# Patient Record
Sex: MALE | Race: WHITE | NOT HISPANIC OR LATINO | URBAN - METROPOLITAN AREA
[De-identification: names, ages, dates, MRNs, and addresses within clinical notes are randomized per-mention and may not be internally consistent; named-entity substitution may affect disease eponyms.]

---

## 2017-01-09 ENCOUNTER — INPATIENT (INPATIENT)
Facility: HOSPITAL | Age: 77
LOS: 12 days | Discharge: HOME HEALTH | DRG: 220 | End: 2017-01-22
Attending: THORACIC SURGERY (CARDIOTHORACIC VASCULAR SURGERY) | Admitting: THORACIC SURGERY (CARDIOTHORACIC VASCULAR SURGERY)
Payer: MEDICARE

## 2017-01-09 ENCOUNTER — RESULT REVIEW (OUTPATIENT)
Age: 77
End: 2017-01-09

## 2017-01-09 VITALS
OXYGEN SATURATION: 96 % | HEART RATE: 81 BPM | TEMPERATURE: 98 F | DIASTOLIC BLOOD PRESSURE: 648 MMHG | RESPIRATION RATE: 18 BRPM | SYSTOLIC BLOOD PRESSURE: 123 MMHG

## 2017-01-09 DIAGNOSIS — Z90.79 ACQUIRED ABSENCE OF OTHER GENITAL ORGAN(S): Chronic | ICD-10-CM

## 2017-01-09 DIAGNOSIS — Z95.1 PRESENCE OF AORTOCORONARY BYPASS GRAFT: Chronic | ICD-10-CM

## 2017-01-09 DIAGNOSIS — Z90.89 ACQUIRED ABSENCE OF OTHER ORGANS: Chronic | ICD-10-CM

## 2017-01-09 DIAGNOSIS — Z98.890 OTHER SPECIFIED POSTPROCEDURAL STATES: Chronic | ICD-10-CM

## 2017-01-09 LAB
ALBUMIN SERPL ELPH-MCNC: 3.5 G/DL — SIGNIFICANT CHANGE UP (ref 3.4–5)
ALP SERPL-CCNC: 82 U/L — SIGNIFICANT CHANGE UP (ref 40–120)
ALT FLD-CCNC: 20 U/L — SIGNIFICANT CHANGE UP (ref 12–42)
ANION GAP SERPL CALC-SCNC: 12 MMOL/L — SIGNIFICANT CHANGE UP (ref 9–16)
APPEARANCE UR: CLEAR — SIGNIFICANT CHANGE UP
APTT BLD: 37.2 SEC — SIGNIFICANT CHANGE UP (ref 27.5–37.4)
AST SERPL-CCNC: 13 U/L — LOW (ref 15–37)
BASOPHILS NFR BLD AUTO: 0.2 % — SIGNIFICANT CHANGE UP (ref 0–2)
BILIRUB SERPL-MCNC: 0.6 MG/DL — SIGNIFICANT CHANGE UP (ref 0.2–1.2)
BILIRUB UR-MCNC: NEGATIVE — SIGNIFICANT CHANGE UP
BLD GP AB SCN SERPL QL: NEGATIVE — SIGNIFICANT CHANGE UP
BLD GP AB SCN SERPL QL: NEGATIVE — SIGNIFICANT CHANGE UP
BUN SERPL-MCNC: 20 MG/DL — SIGNIFICANT CHANGE UP (ref 7–23)
CALCIUM SERPL-MCNC: 8.2 MG/DL — LOW (ref 8.5–10.5)
CHLORIDE SERPL-SCNC: 107 MMOL/L — SIGNIFICANT CHANGE UP (ref 96–108)
CO2 SERPL-SCNC: 24 MMOL/L — SIGNIFICANT CHANGE UP (ref 22–31)
COLOR SPEC: YELLOW — SIGNIFICANT CHANGE UP
CREAT SERPL-MCNC: 0.92 MG/DL — SIGNIFICANT CHANGE UP (ref 0.5–1.3)
DIFF PNL FLD: NEGATIVE — SIGNIFICANT CHANGE UP
EOSINOPHIL NFR BLD AUTO: 0 % — SIGNIFICANT CHANGE UP (ref 0–6)
GLUCOSE SERPL-MCNC: 98 MG/DL — SIGNIFICANT CHANGE UP (ref 70–99)
GLUCOSE UR QL: NEGATIVE — SIGNIFICANT CHANGE UP
HBA1C BLD-MCNC: 6 % — HIGH (ref 4.8–5.6)
HCT VFR BLD CALC: 40.3 % — SIGNIFICANT CHANGE UP (ref 39–50)
HGB BLD-MCNC: 13.4 G/DL — SIGNIFICANT CHANGE UP (ref 13–17)
INR BLD: 1.2 — HIGH (ref 0.88–1.16)
KETONES UR-MCNC: NEGATIVE — SIGNIFICANT CHANGE UP
LEUKOCYTE ESTERASE UR-ACNC: (no result)
LYMPHOCYTES # BLD AUTO: 23.7 % — SIGNIFICANT CHANGE UP (ref 13–44)
MCHC RBC-ENTMCNC: 26.2 PG — LOW (ref 27–34)
MCHC RBC-ENTMCNC: 33.3 G/DL — SIGNIFICANT CHANGE UP (ref 32–36)
MCV RBC AUTO: 78.9 FL — LOW (ref 80–100)
MONOCYTES NFR BLD AUTO: 10 % — SIGNIFICANT CHANGE UP (ref 2–14)
NEUTROPHILS NFR BLD AUTO: 66.1 % — SIGNIFICANT CHANGE UP (ref 43–77)
NITRITE UR-MCNC: NEGATIVE — SIGNIFICANT CHANGE UP
NT-PROBNP SERPL-SCNC: 534 PG/ML — HIGH
PH UR: 5.5 — SIGNIFICANT CHANGE UP (ref 4–8)
PLATELET # BLD AUTO: 147 K/UL — LOW (ref 150–400)
POTASSIUM SERPL-MCNC: 3.6 MMOL/L — SIGNIFICANT CHANGE UP (ref 3.5–5.3)
POTASSIUM SERPL-SCNC: 3.6 MMOL/L — SIGNIFICANT CHANGE UP (ref 3.5–5.3)
PROT SERPL-MCNC: 6.5 G/DL — SIGNIFICANT CHANGE UP (ref 6.4–8.2)
PROT UR-MCNC: NEGATIVE MG/DL — SIGNIFICANT CHANGE UP
PROTHROM AB SERPL-ACNC: 13.3 SEC — HIGH (ref 10–13.1)
RBC # BLD: 5.11 M/UL — SIGNIFICANT CHANGE UP (ref 4.2–5.8)
RBC # FLD: 14.3 % — SIGNIFICANT CHANGE UP (ref 10.3–16.9)
RH IG SCN BLD-IMP: POSITIVE — SIGNIFICANT CHANGE UP
RH IG SCN BLD-IMP: POSITIVE — SIGNIFICANT CHANGE UP
SODIUM SERPL-SCNC: 143 MMOL/L — SIGNIFICANT CHANGE UP (ref 135–145)
SP GR SPEC: 1.02 — SIGNIFICANT CHANGE UP (ref 1–1.03)
TSH SERPL-MCNC: 0.74 UIU/ML — SIGNIFICANT CHANGE UP (ref 0.35–4.94)
UROBILINOGEN FLD QL: 0.2 E.U./DL — SIGNIFICANT CHANGE UP
WBC # BLD: 6.5 K/UL — SIGNIFICANT CHANGE UP (ref 3.8–10.5)
WBC # FLD AUTO: 6.5 K/UL — SIGNIFICANT CHANGE UP (ref 3.8–10.5)

## 2017-01-09 PROCEDURE — 93306 TTE W/DOPPLER COMPLETE: CPT | Mod: 26

## 2017-01-09 PROCEDURE — 70450 CT HEAD/BRAIN W/O DYE: CPT | Mod: 26

## 2017-01-09 PROCEDURE — 71010: CPT | Mod: 26

## 2017-01-09 PROCEDURE — 93010 ELECTROCARDIOGRAM REPORT: CPT

## 2017-01-09 RX ORDER — METOPROLOL TARTRATE 50 MG
12.5 TABLET ORAL
Qty: 0 | Refills: 0 | Status: DISCONTINUED | OUTPATIENT
Start: 2017-01-09 | End: 2017-01-10

## 2017-01-09 RX ORDER — FAMOTIDINE 10 MG/ML
20 INJECTION INTRAVENOUS
Qty: 0 | Refills: 0 | Status: DISCONTINUED | OUTPATIENT
Start: 2017-01-09 | End: 2017-01-10

## 2017-01-09 RX ORDER — ASPIRIN/CALCIUM CARB/MAGNESIUM 324 MG
81 TABLET ORAL DAILY
Qty: 0 | Refills: 0 | Status: DISCONTINUED | OUTPATIENT
Start: 2017-01-09 | End: 2017-01-10

## 2017-01-09 RX ORDER — CHLORHEXIDINE GLUCONATE 213 G/1000ML
1 SOLUTION TOPICAL ONCE
Qty: 0 | Refills: 0 | Status: COMPLETED | OUTPATIENT
Start: 2017-01-09 | End: 2017-01-09

## 2017-01-09 RX ORDER — SODIUM CHLORIDE 9 MG/ML
3 INJECTION INTRAMUSCULAR; INTRAVENOUS; SUBCUTANEOUS EVERY 8 HOURS
Qty: 0 | Refills: 0 | Status: DISCONTINUED | OUTPATIENT
Start: 2017-01-09 | End: 2017-01-10

## 2017-01-09 RX ORDER — ATORVASTATIN CALCIUM 80 MG/1
40 TABLET, FILM COATED ORAL AT BEDTIME
Qty: 0 | Refills: 0 | Status: DISCONTINUED | OUTPATIENT
Start: 2017-01-09 | End: 2017-01-10

## 2017-01-09 RX ORDER — HEPARIN SODIUM 5000 [USP'U]/ML
5000 INJECTION INTRAVENOUS; SUBCUTANEOUS EVERY 8 HOURS
Qty: 0 | Refills: 0 | Status: DISCONTINUED | OUTPATIENT
Start: 2017-01-09 | End: 2017-01-10

## 2017-01-09 RX ORDER — FLUOXETINE HCL 10 MG
40 CAPSULE ORAL ONCE
Qty: 0 | Refills: 0 | Status: DISCONTINUED | OUTPATIENT
Start: 2017-01-10 | End: 2017-01-10

## 2017-01-09 RX ORDER — CHLORHEXIDINE GLUCONATE 213 G/1000ML
10 SOLUTION TOPICAL ONCE
Qty: 0 | Refills: 0 | Status: COMPLETED | OUTPATIENT
Start: 2017-01-09 | End: 2017-01-10

## 2017-01-09 RX ADMIN — CHLORHEXIDINE GLUCONATE 1 APPLICATION(S): 213 SOLUTION TOPICAL at 21:30

## 2017-01-09 RX ADMIN — SODIUM CHLORIDE 3 MILLILITER(S): 9 INJECTION INTRAMUSCULAR; INTRAVENOUS; SUBCUTANEOUS at 15:55

## 2017-01-09 RX ADMIN — SODIUM CHLORIDE 3 MILLILITER(S): 9 INJECTION INTRAMUSCULAR; INTRAVENOUS; SUBCUTANEOUS at 21:37

## 2017-01-09 RX ADMIN — Medication 12.5 MILLIGRAM(S): at 18:30

## 2017-01-09 RX ADMIN — ATORVASTATIN CALCIUM 40 MILLIGRAM(S): 80 TABLET, FILM COATED ORAL at 21:37

## 2017-01-09 RX ADMIN — FAMOTIDINE 20 MILLIGRAM(S): 10 INJECTION INTRAVENOUS at 18:31

## 2017-01-09 RX ADMIN — Medication 81 MILLIGRAM(S): at 18:30

## 2017-01-09 NOTE — H&P ADULT. - PMH
Asthma    Cervical stenosis of spine    CVA (cerebral vascular accident)    Hiatal hernia    Hyperlipidemia    Hypertension    Obstructive sleep apnea    Prostate cancer    Prostate cancer    Temporal arteritis

## 2017-01-09 NOTE — H&P ADULT. - PSH
H/O prostatectomy    H/O: knee surgery    History of tonsillectomy    S/P CABG (coronary artery bypass graft)

## 2017-01-09 NOTE — H&P ADULT. - ASSESSMENT
76 year old male with history of CAD s/p CABG x4 in 2005, asthma, COPD, hypertension, hyperlipidemia, obstructive sleep apnea uses CPAP at night, Temporal arteritis, hiatal hernia, cervical and lumbar stenosis, prostate cancer s/p radial prostatectomy in 2003 (no radiation or chemo) who presents for elective Re-op ascending aorta and hemiarch replacement     Neuro: history of CVA   - CT head for baseline.     CVS: his 76 year old male with history of CAD s/p CABG x4 in 2005, asthma, COPD, hypertension, hyperlipidemia, obstructive sleep apnea uses CPAP at night, Temporal arteritis, hiatal hernia, cervical and lumbar stenosis, prostate cancer s/p radial prostatectomy in 2003 (no radiation or chemo) who presents for elective Re-op ascending aorta and hemiarch replacement     Neuro: history of CVA   - CT head for baseline.     CVS: history of HTN Thoracic Aortic Aneurysm measuring 6.2 cm. without valvular disease or CAD.   - Patient took Atenolol and MICARDIS this morning. blood pressure currently well controlled   - EKG performed without evidence of acute changes.   - Repeat Echo without valve disease   - NPO after midnight. Pre-op orders in   - Consent signed and in chart.   - blood products ordered   - Will order ASA and statin for this evening     Respiratory: history of asthma and PAUL  - History of smoking ordered for PFTs and carotids  - Chest Xray   - Continue to monitor for respiratory distress.   - Patient wears BIPAP at night. Brought his own machine     GI: No active issues   - Start on Pepcid for GI PPX    : No active issues (UA negative for infection and creatinine .92_   - Continue to monitor I/Os and Cre    Endo: awaiting A1c. Thyroid function normal   - follow up A1C    Heme: No active issues WBC and H/H within normal limits  - Continue to trend.   - start on Heparin SQ 5000units every 8 hours     Psych: history of depression   - Continue home dose of Prozac     Huma Robbins PA-C

## 2017-01-09 NOTE — H&P ADULT. - HISTORY OF PRESENT ILLNESS
76 year old male with history of CAD s/p CABG x4 in 2005, asthma, COPD, hypertension, hyperlipidemia, obstructive sleep apnea uses CPAP at night, Temporal arteritis, hiatal hernia, cervical and lumbar stenosis, prostate cancer s/p radial prostatectomy in 2003 (no radiation or chemo) who presents for elective operation for thoracic aortic aneurysm. Patient had known thoracic aortic aneursym, that was discovered incidentally on routine diagnostic imaging. Patient has been undergoing serial CT scans every year or two in order to monitor for growth. CT chest on 6/28/16 revealed an ascending aorta measuring 6.2 x 5.9cm, aortic arch 4.9cm descending aora 4 x3.6cm and abdominal aorta 4.2cm. Patient is from [place], [place] and decided to come to [place] for operation. Of note, patient had cardiac catheterization with post procedural ischemic stroke. Patient states that he has no residual neuro deficit. On Admission patient states that he feels well. Denies SOB, chest pain, leg swelling, n/v/ abdominal pain, fever, chills or calf tenderness. 76 year old male with history of CAD s/p CABG x4 in 2005, asthma, COPD, hypertension, hyperlipidemia, obstructive sleep apnea uses CPAP at night, Temporal arteritis, hiatal hernia, cervical and lumbar stenosis, prostate cancer s/p radial prostatectomy in 2003 (no radiation or chemo) who presents for elective operation for thoracic aortic aneurysm. Patient had known thoracic aortic aneursym, that was discovered incidentally on routine diagnostic imaging. Patient has been undergoing serial CT scans every year or two in order to monitor for growth. CT chest on 6/28/16 revealed an ascending aorta measuring 6.2 x 5.9cm, aortic arch 4.9cm descending aora 4 x3.6cm and abdominal aorta 4.2cm. Patient is from [place], [place] and decided to come to [place] for operation. He was suppose to undergo the operation in November 2016 but during his pre-operative cardiac catheterization he had a post procedural ischemic stroke. Elective procedure was post-poned 6 weeks Patient states that he has no residual neuro deficit. Cath showed the previous bypass grafts were patent. On Admission patient states that he feels well. Denies SOB, chest pain, leg swelling, n/v/ abdominal pain, fever, chills or calf tenderness.

## 2017-01-09 NOTE — H&P ADULT. - NEGATIVE ENMT SYMPTOMS
no nasal congestion/no tinnitus/no throat pain/no vertigo/no nose bleeds/no dry mouth/no nasal discharge

## 2017-01-10 ENCOUNTER — APPOINTMENT (OUTPATIENT)
Dept: CARDIOTHORACIC SURGERY | Facility: HOSPITAL | Age: 77
End: 2017-01-10

## 2017-01-10 LAB
ALBUMIN SERPL ELPH-MCNC: 3 G/DL — LOW (ref 3.4–5)
ALP SERPL-CCNC: 68 U/L — SIGNIFICANT CHANGE UP (ref 40–120)
ALT FLD-CCNC: 16 U/L — SIGNIFICANT CHANGE UP (ref 12–42)
ANION GAP SERPL CALC-SCNC: 10 MMOL/L — SIGNIFICANT CHANGE UP (ref 9–16)
ANION GAP SERPL CALC-SCNC: 8 MMOL/L — LOW (ref 9–16)
ANION GAP SERPL CALC-SCNC: 8 MMOL/L — LOW (ref 9–16)
APTT BLD: 34.6 SEC — SIGNIFICANT CHANGE UP (ref 27.5–37.4)
APTT BLD: 45.2 SEC — HIGH (ref 27.5–37.4)
AST SERPL-CCNC: 11 U/L — LOW (ref 15–37)
BASE EXCESS BLDA CALC-SCNC: -1.3 MMOL/L — SIGNIFICANT CHANGE UP (ref -2–3)
BASE EXCESS BLDA CALC-SCNC: -2.5 MMOL/L — LOW (ref -2–3)
BASE EXCESS BLDA CALC-SCNC: 0.4 MMOL/L — SIGNIFICANT CHANGE UP (ref -2–3)
BILIRUB SERPL-MCNC: 0.8 MG/DL — SIGNIFICANT CHANGE UP (ref 0.2–1.2)
BUN SERPL-MCNC: 14 MG/DL — SIGNIFICANT CHANGE UP (ref 7–23)
BUN SERPL-MCNC: 15 MG/DL — SIGNIFICANT CHANGE UP (ref 7–23)
BUN SERPL-MCNC: 16 MG/DL — SIGNIFICANT CHANGE UP (ref 7–23)
CA-I BLDA-SCNC: 1.09 MMOL/L — LOW (ref 1.1–1.3)
CALCIUM SERPL-MCNC: 7.4 MG/DL — LOW (ref 8.5–10.5)
CALCIUM SERPL-MCNC: 7.5 MG/DL — LOW (ref 8.5–10.5)
CALCIUM SERPL-MCNC: 8.4 MG/DL — LOW (ref 8.5–10.5)
CHLORIDE SERPL-SCNC: 107 MMOL/L — SIGNIFICANT CHANGE UP (ref 96–108)
CHLORIDE SERPL-SCNC: 111 MMOL/L — HIGH (ref 96–108)
CHLORIDE SERPL-SCNC: 111 MMOL/L — HIGH (ref 96–108)
CO2 SERPL-SCNC: 23 MMOL/L — SIGNIFICANT CHANGE UP (ref 22–31)
CO2 SERPL-SCNC: 25 MMOL/L — SIGNIFICANT CHANGE UP (ref 22–31)
CO2 SERPL-SCNC: 26 MMOL/L — SIGNIFICANT CHANGE UP (ref 22–31)
COHGB MFR BLDA: 0.9 % — SIGNIFICANT CHANGE UP
CREAT SERPL-MCNC: 0.91 MG/DL — SIGNIFICANT CHANGE UP (ref 0.5–1.3)
CREAT SERPL-MCNC: 0.99 MG/DL — SIGNIFICANT CHANGE UP (ref 0.5–1.3)
CREAT SERPL-MCNC: 1.03 MG/DL — SIGNIFICANT CHANGE UP (ref 0.5–1.3)
GAS PNL BLDA: SIGNIFICANT CHANGE UP
GLUCOSE SERPL-MCNC: 162 MG/DL — HIGH (ref 70–99)
GLUCOSE SERPL-MCNC: 189 MG/DL — HIGH (ref 70–99)
GLUCOSE SERPL-MCNC: 91 MG/DL — SIGNIFICANT CHANGE UP (ref 70–99)
HCO3 BLDA-SCNC: 23 MMOL/L — SIGNIFICANT CHANGE UP (ref 21–28)
HCO3 BLDA-SCNC: 24 MMOL/L — SIGNIFICANT CHANGE UP (ref 21–28)
HCO3 BLDA-SCNC: 25 MMOL/L — SIGNIFICANT CHANGE UP (ref 21–28)
HCT VFR BLD CALC: 29.2 % — LOW (ref 39–50)
HCT VFR BLD CALC: 30.3 % — LOW (ref 39–50)
HCT VFR BLD CALC: 37.9 % — LOW (ref 39–50)
HGB BLD-MCNC: 10.1 G/DL — LOW (ref 13–17)
HGB BLD-MCNC: 12.7 G/DL — LOW (ref 13–17)
HGB BLD-MCNC: 9.8 G/DL — LOW (ref 13–17)
HGB BLDA-MCNC: 13.5 G/DL — SIGNIFICANT CHANGE UP (ref 13–17)
INR BLD: 1.34 — HIGH (ref 0.88–1.16)
INR BLD: 1.38 — HIGH (ref 0.88–1.16)
LACTATE SERPL-SCNC: 1.8 MMOL/L — SIGNIFICANT CHANGE UP (ref 0.5–2)
LYMPHOCYTES # BLD AUTO: 40.8 % — SIGNIFICANT CHANGE UP (ref 13–44)
LYMPHOCYTES # BLD AUTO: 7.4 % — LOW (ref 13–44)
MAGNESIUM SERPL-MCNC: 2.4 MG/DL — SIGNIFICANT CHANGE UP (ref 1.6–2.4)
MAGNESIUM SERPL-MCNC: 2.6 MG/DL — HIGH (ref 1.6–2.4)
MCHC RBC-ENTMCNC: 26.1 PG — LOW (ref 27–34)
MCHC RBC-ENTMCNC: 26.5 PG — LOW (ref 27–34)
MCHC RBC-ENTMCNC: 26.9 PG — LOW (ref 27–34)
MCHC RBC-ENTMCNC: 33.3 G/DL — SIGNIFICANT CHANGE UP (ref 32–36)
MCHC RBC-ENTMCNC: 33.5 G/DL — SIGNIFICANT CHANGE UP (ref 32–36)
MCHC RBC-ENTMCNC: 33.6 G/DL — SIGNIFICANT CHANGE UP (ref 32–36)
MCV RBC AUTO: 78 FL — LOW (ref 80–100)
MCV RBC AUTO: 79.5 FL — LOW (ref 80–100)
MCV RBC AUTO: 80.2 FL — SIGNIFICANT CHANGE UP (ref 80–100)
METHGB MFR BLDA: 0.3 % — SIGNIFICANT CHANGE UP
MONOCYTES NFR BLD AUTO: 13 % — SIGNIFICANT CHANGE UP (ref 2–14)
MONOCYTES NFR BLD AUTO: 13.4 % — SIGNIFICANT CHANGE UP (ref 2–14)
NEUTROPHILS NFR BLD AUTO: 46.2 % — SIGNIFICANT CHANGE UP (ref 43–77)
NEUTROPHILS NFR BLD AUTO: 79.2 % — HIGH (ref 43–77)
O2 CT VFR BLDA CALC: 20 ML/DL — SIGNIFICANT CHANGE UP (ref 15–23)
OXYHGB MFR BLDA: 98 % — SIGNIFICANT CHANGE UP (ref 94–100)
PCO2 BLDA: 40 MMHG — SIGNIFICANT CHANGE UP (ref 35–48)
PCO2 BLDA: 40 MMHG — SIGNIFICANT CHANGE UP (ref 35–48)
PCO2 BLDA: 41 MMHG — SIGNIFICANT CHANGE UP (ref 35–48)
PH BLDA: 7.37 — SIGNIFICANT CHANGE UP (ref 7.35–7.45)
PH BLDA: 7.39 — SIGNIFICANT CHANGE UP (ref 7.35–7.45)
PH BLDA: 7.41 — SIGNIFICANT CHANGE UP (ref 7.35–7.45)
PLATELET # BLD AUTO: 110 K/UL — LOW (ref 150–400)
PLATELET # BLD AUTO: 121 K/UL — LOW (ref 150–400)
PLATELET # BLD AUTO: 150 K/UL — SIGNIFICANT CHANGE UP (ref 150–400)
PO2 BLDA: 144 MMHG — HIGH (ref 83–108)
PO2 BLDA: 150 MMHG — HIGH (ref 83–108)
PO2 BLDA: 485 MMHG — HIGH (ref 83–108)
POTASSIUM BLDA-SCNC: 3.6 MMOL/L — SIGNIFICANT CHANGE UP (ref 3.5–4.9)
POTASSIUM SERPL-MCNC: 3.5 MMOL/L — SIGNIFICANT CHANGE UP (ref 3.5–5.3)
POTASSIUM SERPL-MCNC: 3.9 MMOL/L — SIGNIFICANT CHANGE UP (ref 3.5–5.3)
POTASSIUM SERPL-MCNC: 4.4 MMOL/L — SIGNIFICANT CHANGE UP (ref 3.5–5.3)
POTASSIUM SERPL-SCNC: 3.5 MMOL/L — SIGNIFICANT CHANGE UP (ref 3.5–5.3)
POTASSIUM SERPL-SCNC: 3.9 MMOL/L — SIGNIFICANT CHANGE UP (ref 3.5–5.3)
POTASSIUM SERPL-SCNC: 4.4 MMOL/L — SIGNIFICANT CHANGE UP (ref 3.5–5.3)
PROT SERPL-MCNC: 6 G/DL — LOW (ref 6.4–8.2)
PROTHROM AB SERPL-ACNC: 14.9 SEC — HIGH (ref 10–13.1)
PROTHROM AB SERPL-ACNC: 15.4 SEC — HIGH (ref 10–13.1)
RBC # BLD: 3.64 M/UL — LOW (ref 4.2–5.8)
RBC # BLD: 3.81 M/UL — LOW (ref 4.2–5.8)
RBC # BLD: 4.86 M/UL — SIGNIFICANT CHANGE UP (ref 4.2–5.8)
RBC # FLD: 14.2 % — SIGNIFICANT CHANGE UP (ref 10.3–16.9)
RBC # FLD: 14.5 % — SIGNIFICANT CHANGE UP (ref 10.3–16.9)
RBC # FLD: 14.5 % — SIGNIFICANT CHANGE UP (ref 10.3–16.9)
SAO2 % BLDA: 100 % — SIGNIFICANT CHANGE UP (ref 95–100)
SAO2 % BLDA: 99 % — SIGNIFICANT CHANGE UP (ref 95–100)
SAO2 % BLDA: 99 % — SIGNIFICANT CHANGE UP (ref 95–100)
SODIUM BLDA-SCNC: 137 MMOL/L — LOW (ref 138–146)
SODIUM SERPL-SCNC: 141 MMOL/L — SIGNIFICANT CHANGE UP (ref 135–145)
SODIUM SERPL-SCNC: 144 MMOL/L — SIGNIFICANT CHANGE UP (ref 135–145)
SODIUM SERPL-SCNC: 144 MMOL/L — SIGNIFICANT CHANGE UP (ref 135–145)
WBC # BLD: 4.8 K/UL — SIGNIFICANT CHANGE UP (ref 3.8–10.5)
WBC # BLD: 6.7 K/UL — SIGNIFICANT CHANGE UP (ref 3.8–10.5)
WBC # BLD: 7.2 K/UL — SIGNIFICANT CHANGE UP (ref 3.8–10.5)
WBC # FLD AUTO: 4.8 K/UL — SIGNIFICANT CHANGE UP (ref 3.8–10.5)
WBC # FLD AUTO: 6.7 K/UL — SIGNIFICANT CHANGE UP (ref 3.8–10.5)
WBC # FLD AUTO: 7.2 K/UL — SIGNIFICANT CHANGE UP (ref 3.8–10.5)

## 2017-01-10 PROCEDURE — 99291 CRITICAL CARE FIRST HOUR: CPT

## 2017-01-10 PROCEDURE — 71010: CPT | Mod: 26

## 2017-01-10 PROCEDURE — 99292 CRITICAL CARE ADDL 30 MIN: CPT

## 2017-01-10 PROCEDURE — 93010 ELECTROCARDIOGRAM REPORT: CPT

## 2017-01-10 RX ORDER — PROPOFOL 10 MG/ML
5 INJECTION, EMULSION INTRAVENOUS
Qty: 1000 | Refills: 0 | Status: DISCONTINUED | OUTPATIENT
Start: 2017-01-10 | End: 2017-01-10

## 2017-01-10 RX ORDER — CHLORHEXIDINE GLUCONATE 213 G/1000ML
5 SOLUTION TOPICAL EVERY 4 HOURS
Qty: 0 | Refills: 0 | Status: DISCONTINUED | OUTPATIENT
Start: 2017-01-10 | End: 2017-01-11

## 2017-01-10 RX ORDER — ALBUMIN HUMAN 25 %
250 VIAL (ML) INTRAVENOUS
Qty: 0 | Refills: 0 | Status: COMPLETED | OUTPATIENT
Start: 2017-01-10 | End: 2017-01-10

## 2017-01-10 RX ORDER — VASOPRESSIN 20 [USP'U]/ML
0.03 INJECTION INTRAVENOUS
Qty: 100 | Refills: 0 | Status: DISCONTINUED | OUTPATIENT
Start: 2017-01-10 | End: 2017-01-11

## 2017-01-10 RX ORDER — CLEVIDIPINE BUTYRATE 50MG/100ML
2.5 VIAL (ML) INTRAVENOUS
Qty: 25 | Refills: 0 | Status: DISCONTINUED | OUTPATIENT
Start: 2017-01-10 | End: 2017-01-10

## 2017-01-10 RX ORDER — FENTANYL CITRATE 50 UG/ML
25 INJECTION INTRAVENOUS ONCE
Qty: 0 | Refills: 0 | Status: DISCONTINUED | OUTPATIENT
Start: 2017-01-10 | End: 2017-01-10

## 2017-01-10 RX ORDER — MEPERIDINE HYDROCHLORIDE 50 MG/ML
25 INJECTION INTRAMUSCULAR; INTRAVENOUS; SUBCUTANEOUS ONCE
Qty: 0 | Refills: 0 | Status: DISCONTINUED | OUTPATIENT
Start: 2017-01-10 | End: 2017-01-10

## 2017-01-10 RX ORDER — FAMOTIDINE 10 MG/ML
20 INJECTION INTRAVENOUS EVERY 12 HOURS
Qty: 0 | Refills: 0 | Status: DISCONTINUED | OUTPATIENT
Start: 2017-01-10 | End: 2017-01-11

## 2017-01-10 RX ORDER — SODIUM CHLORIDE 9 MG/ML
1000 INJECTION, SOLUTION INTRAVENOUS
Qty: 0 | Refills: 0 | Status: DISCONTINUED | OUTPATIENT
Start: 2017-01-10 | End: 2017-01-13

## 2017-01-10 RX ORDER — CEFAZOLIN SODIUM 1 G
2000 VIAL (EA) INJECTION EVERY 8 HOURS
Qty: 0 | Refills: 0 | Status: COMPLETED | OUTPATIENT
Start: 2017-01-10 | End: 2017-01-12

## 2017-01-10 RX ORDER — PROPOFOL 10 MG/ML
10 INJECTION, EMULSION INTRAVENOUS
Qty: 1000 | Refills: 0 | Status: DISCONTINUED | OUTPATIENT
Start: 2017-01-10 | End: 2017-01-11

## 2017-01-10 RX ORDER — CALCIUM GLUCONATE 100 MG/ML
2 VIAL (ML) INTRAVENOUS ONCE
Qty: 0 | Refills: 0 | Status: COMPLETED | OUTPATIENT
Start: 2017-01-10 | End: 2017-01-10

## 2017-01-10 RX ORDER — FENTANYL CITRATE 50 UG/ML
25 INJECTION INTRAVENOUS ONCE
Qty: 0 | Refills: 0 | Status: DISCONTINUED | OUTPATIENT
Start: 2017-01-10 | End: 2017-01-11

## 2017-01-10 RX ORDER — POTASSIUM CHLORIDE 20 MEQ
20 PACKET (EA) ORAL ONCE
Qty: 0 | Refills: 0 | Status: COMPLETED | OUTPATIENT
Start: 2017-01-10 | End: 2017-01-10

## 2017-01-10 RX ORDER — INSULIN HUMAN 100 [IU]/ML
1 INJECTION, SOLUTION SUBCUTANEOUS
Qty: 250 | Refills: 0 | Status: DISCONTINUED | OUTPATIENT
Start: 2017-01-10 | End: 2017-01-11

## 2017-01-10 RX ADMIN — CHLORHEXIDINE GLUCONATE 10 MILLILITER(S): 213 SOLUTION TOPICAL at 05:45

## 2017-01-10 RX ADMIN — INSULIN HUMAN 1 UNIT(S)/HR: 100 INJECTION, SOLUTION SUBCUTANEOUS at 23:49

## 2017-01-10 RX ADMIN — FAMOTIDINE 20 MILLIGRAM(S): 10 INJECTION INTRAVENOUS at 05:45

## 2017-01-10 RX ADMIN — PROPOFOL 6.96 MICROGRAM(S)/KG/MIN: 10 INJECTION, EMULSION INTRAVENOUS at 23:52

## 2017-01-10 RX ADMIN — FAMOTIDINE 20 MILLIGRAM(S): 10 INJECTION INTRAVENOUS at 19:58

## 2017-01-10 RX ADMIN — Medication 12.5 MILLIGRAM(S): at 05:45

## 2017-01-10 RX ADMIN — Medication 200 GRAM(S): at 23:00

## 2017-01-10 RX ADMIN — Medication 125 MILLILITER(S): at 19:59

## 2017-01-10 RX ADMIN — Medication 100 MILLIGRAM(S): at 00:15

## 2017-01-10 RX ADMIN — Medication 100 MILLIEQUIVALENT(S): at 23:43

## 2017-01-10 RX ADMIN — SODIUM CHLORIDE 3 MILLILITER(S): 9 INJECTION INTRAMUSCULAR; INTRAVENOUS; SUBCUTANEOUS at 05:52

## 2017-01-10 RX ADMIN — Medication 125 MILLILITER(S): at 20:16

## 2017-01-10 RX ADMIN — FENTANYL CITRATE 25 MICROGRAM(S): 50 INJECTION INTRAVENOUS at 18:30

## 2017-01-10 RX ADMIN — FENTANYL CITRATE 25 MICROGRAM(S): 50 INJECTION INTRAVENOUS at 19:06

## 2017-01-10 NOTE — BRIEF OPERATIVE NOTE - OPERATION/FINDINGS
Procedures: ascending aortic and hemiarch replacement with reimplantation of coronaries    EF60%  Aortic clamp time: 90 mins  Total bypass time: 240 mins  CA+SACP: 14 mins

## 2017-01-11 LAB
ANION GAP SERPL CALC-SCNC: 9 MMOL/L — SIGNIFICANT CHANGE UP (ref 9–16)
APTT BLD: 29.1 SEC — SIGNIFICANT CHANGE UP (ref 27.5–37.4)
BASE EXCESS BLDA CALC-SCNC: -1.7 MMOL/L — SIGNIFICANT CHANGE UP (ref -2–3)
BASE EXCESS BLDA CALC-SCNC: 0.8 MMOL/L — SIGNIFICANT CHANGE UP (ref -2–3)
BUN SERPL-MCNC: 17 MG/DL — SIGNIFICANT CHANGE UP (ref 7–23)
BUN SERPL-MCNC: 17 MG/DL — SIGNIFICANT CHANGE UP (ref 7–23)
BUN SERPL-MCNC: 18 MG/DL — SIGNIFICANT CHANGE UP (ref 7–23)
CALCIUM SERPL-MCNC: 7.5 MG/DL — LOW (ref 8.5–10.5)
CALCIUM SERPL-MCNC: 7.9 MG/DL — LOW (ref 8.5–10.5)
CALCIUM SERPL-MCNC: 8 MG/DL — LOW (ref 8.5–10.5)
CHLORIDE SERPL-SCNC: 109 MMOL/L — HIGH (ref 96–108)
CHLORIDE SERPL-SCNC: 113 MMOL/L — HIGH (ref 96–108)
CHLORIDE SERPL-SCNC: 114 MMOL/L — HIGH (ref 96–108)
CO2 SERPL-SCNC: 24 MMOL/L — SIGNIFICANT CHANGE UP (ref 22–31)
CREAT SERPL-MCNC: 0.95 MG/DL — SIGNIFICANT CHANGE UP (ref 0.5–1.3)
CREAT SERPL-MCNC: 0.97 MG/DL — SIGNIFICANT CHANGE UP (ref 0.5–1.3)
CREAT SERPL-MCNC: 1.02 MG/DL — SIGNIFICANT CHANGE UP (ref 0.5–1.3)
GAS PNL BLDA: SIGNIFICANT CHANGE UP
GLUCOSE SERPL-MCNC: 126 MG/DL — HIGH (ref 70–99)
GLUCOSE SERPL-MCNC: 139 MG/DL — HIGH (ref 70–99)
GLUCOSE SERPL-MCNC: 162 MG/DL — HIGH (ref 70–99)
HCO3 BLDA-SCNC: 23 MMOL/L — SIGNIFICANT CHANGE UP (ref 21–28)
HCO3 BLDA-SCNC: 24 MMOL/L — SIGNIFICANT CHANGE UP (ref 21–28)
HCT VFR BLD CALC: 28.2 % — LOW (ref 39–50)
HCT VFR BLD CALC: 28.7 % — LOW (ref 39–50)
HCT VFR BLD CALC: 28.9 % — LOW (ref 39–50)
HGB BLD-MCNC: 9.5 G/DL — LOW (ref 13–17)
HGB BLD-MCNC: 9.6 G/DL — LOW (ref 13–17)
HGB BLD-MCNC: 9.7 G/DL — LOW (ref 13–17)
INR BLD: 1.29 — HIGH (ref 0.88–1.16)
LACTATE SERPL-SCNC: 1.5 MMOL/L — SIGNIFICANT CHANGE UP (ref 0.5–2)
MAGNESIUM SERPL-MCNC: 2 MG/DL — SIGNIFICANT CHANGE UP (ref 1.6–2.4)
MAGNESIUM SERPL-MCNC: 2.2 MG/DL — SIGNIFICANT CHANGE UP (ref 1.6–2.4)
MCHC RBC-ENTMCNC: 26.4 PG — LOW (ref 27–34)
MCHC RBC-ENTMCNC: 26.7 PG — LOW (ref 27–34)
MCHC RBC-ENTMCNC: 26.9 PG — LOW (ref 27–34)
MCHC RBC-ENTMCNC: 33.2 G/DL — SIGNIFICANT CHANGE UP (ref 32–36)
MCHC RBC-ENTMCNC: 33.7 G/DL — SIGNIFICANT CHANGE UP (ref 32–36)
MCHC RBC-ENTMCNC: 33.8 G/DL — SIGNIFICANT CHANGE UP (ref 32–36)
MCV RBC AUTO: 79.1 FL — LOW (ref 80–100)
MCV RBC AUTO: 79.6 FL — LOW (ref 80–100)
MCV RBC AUTO: 79.9 FL — LOW (ref 80–100)
PCO2 BLDA: 32 MMHG — LOW (ref 35–48)
PCO2 BLDA: 37 MMHG — SIGNIFICANT CHANGE UP (ref 35–48)
PH BLDA: 7.41 — SIGNIFICANT CHANGE UP (ref 7.35–7.45)
PH BLDA: 7.49 — HIGH (ref 7.35–7.45)
PHOSPHATE SERPL-MCNC: 2.8 MG/DL — SIGNIFICANT CHANGE UP (ref 2.5–4.5)
PLATELET # BLD AUTO: 109 K/UL — LOW (ref 150–400)
PLATELET # BLD AUTO: 114 K/UL — LOW (ref 150–400)
PLATELET # BLD AUTO: 89 K/UL — LOW (ref 150–400)
PO2 BLDA: 62 MMHG — LOW (ref 83–108)
PO2 BLDA: 87 MMHG — SIGNIFICANT CHANGE UP (ref 83–108)
POTASSIUM SERPL-MCNC: 3.6 MMOL/L — SIGNIFICANT CHANGE UP (ref 3.5–5.3)
POTASSIUM SERPL-MCNC: 3.8 MMOL/L — SIGNIFICANT CHANGE UP (ref 3.5–5.3)
POTASSIUM SERPL-MCNC: 4 MMOL/L — SIGNIFICANT CHANGE UP (ref 3.5–5.3)
POTASSIUM SERPL-SCNC: 3.6 MMOL/L — SIGNIFICANT CHANGE UP (ref 3.5–5.3)
POTASSIUM SERPL-SCNC: 3.8 MMOL/L — SIGNIFICANT CHANGE UP (ref 3.5–5.3)
POTASSIUM SERPL-SCNC: 4 MMOL/L — SIGNIFICANT CHANGE UP (ref 3.5–5.3)
PROTHROM AB SERPL-ACNC: 14.3 SEC — HIGH (ref 10–13.1)
RBC # BLD: 3.53 M/UL — LOW (ref 4.2–5.8)
RBC # BLD: 3.63 M/UL — LOW (ref 4.2–5.8)
RBC # BLD: 3.63 M/UL — LOW (ref 4.2–5.8)
RBC # FLD: 14.8 % — SIGNIFICANT CHANGE UP (ref 10.3–16.9)
RBC # FLD: 15 % — SIGNIFICANT CHANGE UP (ref 10.3–16.9)
RBC # FLD: 15.1 % — SIGNIFICANT CHANGE UP (ref 10.3–16.9)
SAO2 % BLDA: 93 % — LOW (ref 95–100)
SAO2 % BLDA: 96 % — SIGNIFICANT CHANGE UP (ref 95–100)
SODIUM SERPL-SCNC: 142 MMOL/L — SIGNIFICANT CHANGE UP (ref 135–145)
SODIUM SERPL-SCNC: 146 MMOL/L — HIGH (ref 135–145)
SODIUM SERPL-SCNC: 147 MMOL/L — HIGH (ref 135–145)
WBC # BLD: 5.6 K/UL — SIGNIFICANT CHANGE UP (ref 3.8–10.5)
WBC # BLD: 8.1 K/UL — SIGNIFICANT CHANGE UP (ref 3.8–10.5)
WBC # BLD: 8.2 K/UL — SIGNIFICANT CHANGE UP (ref 3.8–10.5)
WBC # FLD AUTO: 5.6 K/UL — SIGNIFICANT CHANGE UP (ref 3.8–10.5)
WBC # FLD AUTO: 8.1 K/UL — SIGNIFICANT CHANGE UP (ref 3.8–10.5)
WBC # FLD AUTO: 8.2 K/UL — SIGNIFICANT CHANGE UP (ref 3.8–10.5)

## 2017-01-11 PROCEDURE — 99292 CRITICAL CARE ADDL 30 MIN: CPT

## 2017-01-11 PROCEDURE — 93010 ELECTROCARDIOGRAM REPORT: CPT

## 2017-01-11 PROCEDURE — 99291 CRITICAL CARE FIRST HOUR: CPT

## 2017-01-11 PROCEDURE — 71010: CPT | Mod: 26

## 2017-01-11 RX ORDER — AMIODARONE HYDROCHLORIDE 400 MG/1
400 TABLET ORAL EVERY 8 HOURS
Qty: 0 | Refills: 0 | Status: COMPLETED | OUTPATIENT
Start: 2017-01-11 | End: 2017-01-15

## 2017-01-11 RX ORDER — ACETAMINOPHEN 500 MG
1000 TABLET ORAL ONCE
Qty: 0 | Refills: 0 | Status: COMPLETED | OUTPATIENT
Start: 2017-01-11 | End: 2017-01-11

## 2017-01-11 RX ORDER — LIDOCAINE 4 G/100G
1 CREAM TOPICAL ONCE
Qty: 0 | Refills: 0 | Status: COMPLETED | OUTPATIENT
Start: 2017-01-11 | End: 2017-01-11

## 2017-01-11 RX ORDER — ATORVASTATIN CALCIUM 80 MG/1
40 TABLET, FILM COATED ORAL AT BEDTIME
Qty: 0 | Refills: 0 | Status: DISCONTINUED | OUTPATIENT
Start: 2017-01-11 | End: 2017-01-13

## 2017-01-11 RX ORDER — ALBUMIN HUMAN 25 %
250 VIAL (ML) INTRAVENOUS ONCE
Qty: 0 | Refills: 0 | Status: COMPLETED | OUTPATIENT
Start: 2017-01-11 | End: 2017-01-11

## 2017-01-11 RX ORDER — AMIODARONE HYDROCHLORIDE 400 MG/1
200 TABLET ORAL DAILY
Qty: 0 | Refills: 0 | Status: DISCONTINUED | OUTPATIENT
Start: 2017-01-15 | End: 2017-01-18

## 2017-01-11 RX ORDER — FENTANYL CITRATE 50 UG/ML
25 INJECTION INTRAVENOUS ONCE
Qty: 0 | Refills: 0 | Status: DISCONTINUED | OUTPATIENT
Start: 2017-01-11 | End: 2017-01-11

## 2017-01-11 RX ORDER — HEPARIN SODIUM 5000 [USP'U]/ML
5000 INJECTION INTRAVENOUS; SUBCUTANEOUS EVERY 8 HOURS
Qty: 0 | Refills: 0 | Status: DISCONTINUED | OUTPATIENT
Start: 2017-01-11 | End: 2017-01-14

## 2017-01-11 RX ORDER — FUROSEMIDE 40 MG
20 TABLET ORAL ONCE
Qty: 0 | Refills: 0 | Status: COMPLETED | OUTPATIENT
Start: 2017-01-11 | End: 2017-01-11

## 2017-01-11 RX ORDER — METOPROLOL TARTRATE 50 MG
12.5 TABLET ORAL EVERY 12 HOURS
Qty: 0 | Refills: 0 | Status: DISCONTINUED | OUTPATIENT
Start: 2017-01-11 | End: 2017-01-13

## 2017-01-11 RX ORDER — AMIODARONE HYDROCHLORIDE 400 MG/1
150 TABLET ORAL ONCE
Qty: 0 | Refills: 0 | Status: COMPLETED | OUTPATIENT
Start: 2017-01-11 | End: 2017-01-11

## 2017-01-11 RX ORDER — FENTANYL CITRATE 50 UG/ML
25 INJECTION INTRAVENOUS ONCE
Qty: 0 | Refills: 0 | Status: DISCONTINUED | OUTPATIENT
Start: 2017-01-11 | End: 2017-01-12

## 2017-01-11 RX ORDER — INSULIN LISPRO 100/ML
VIAL (ML) SUBCUTANEOUS
Qty: 0 | Refills: 0 | Status: DISCONTINUED | OUTPATIENT
Start: 2017-01-11 | End: 2017-01-13

## 2017-01-11 RX ORDER — POTASSIUM CHLORIDE 20 MEQ
20 PACKET (EA) ORAL ONCE
Qty: 0 | Refills: 0 | Status: COMPLETED | OUTPATIENT
Start: 2017-01-11 | End: 2017-01-11

## 2017-01-11 RX ORDER — SENNA PLUS 8.6 MG/1
2 TABLET ORAL AT BEDTIME
Qty: 0 | Refills: 0 | Status: DISCONTINUED | OUTPATIENT
Start: 2017-01-11 | End: 2017-01-13

## 2017-01-11 RX ORDER — FAMOTIDINE 10 MG/ML
20 INJECTION INTRAVENOUS
Qty: 0 | Refills: 0 | Status: DISCONTINUED | OUTPATIENT
Start: 2017-01-11 | End: 2017-01-17

## 2017-01-11 RX ORDER — ASPIRIN/CALCIUM CARB/MAGNESIUM 324 MG
81 TABLET ORAL DAILY
Qty: 0 | Refills: 0 | Status: DISCONTINUED | OUTPATIENT
Start: 2017-01-11 | End: 2017-01-22

## 2017-01-11 RX ORDER — DOCUSATE SODIUM 100 MG
100 CAPSULE ORAL THREE TIMES A DAY
Qty: 0 | Refills: 0 | Status: DISCONTINUED | OUTPATIENT
Start: 2017-01-11 | End: 2017-01-13

## 2017-01-11 RX ADMIN — VASOPRESSIN 1.8 UNIT(S)/MIN: 20 INJECTION INTRAVENOUS at 02:50

## 2017-01-11 RX ADMIN — Medication 2: at 21:41

## 2017-01-11 RX ADMIN — Medication 81 MILLIGRAM(S): at 14:19

## 2017-01-11 RX ADMIN — Medication 100 MILLIGRAM(S): at 21:41

## 2017-01-11 RX ADMIN — Medication 100 MILLIGRAM(S): at 16:00

## 2017-01-11 RX ADMIN — AMIODARONE HYDROCHLORIDE 200 MILLIGRAM(S): 400 TABLET ORAL at 07:45

## 2017-01-11 RX ADMIN — Medication 1000 MILLIGRAM(S): at 08:37

## 2017-01-11 RX ADMIN — Medication 125 MILLILITER(S): at 05:24

## 2017-01-11 RX ADMIN — FENTANYL CITRATE 25 MICROGRAM(S): 50 INJECTION INTRAVENOUS at 15:05

## 2017-01-11 RX ADMIN — Medication 100 MILLIEQUIVALENT(S): at 12:33

## 2017-01-11 RX ADMIN — AMIODARONE HYDROCHLORIDE 400 MILLIGRAM(S): 400 TABLET ORAL at 11:10

## 2017-01-11 RX ADMIN — Medication 100 MILLIEQUIVALENT(S): at 07:00

## 2017-01-11 RX ADMIN — Medication 125 MILLILITER(S): at 05:00

## 2017-01-11 RX ADMIN — Medication 20 MILLIGRAM(S): at 05:30

## 2017-01-11 RX ADMIN — Medication 12.5 MILLIGRAM(S): at 14:07

## 2017-01-11 RX ADMIN — LIDOCAINE 1 PATCH: 4 CREAM TOPICAL at 17:47

## 2017-01-11 RX ADMIN — FENTANYL CITRATE 25 MICROGRAM(S): 50 INJECTION INTRAVENOUS at 12:44

## 2017-01-11 RX ADMIN — Medication 100 MILLIGRAM(S): at 14:19

## 2017-01-11 RX ADMIN — Medication 2: at 17:46

## 2017-01-11 RX ADMIN — Medication 20 MILLIGRAM(S): at 21:41

## 2017-01-11 RX ADMIN — Medication 100 MILLIGRAM(S): at 07:03

## 2017-01-11 RX ADMIN — FAMOTIDINE 20 MILLIGRAM(S): 10 INJECTION INTRAVENOUS at 17:46

## 2017-01-11 RX ADMIN — ATORVASTATIN CALCIUM 40 MILLIGRAM(S): 80 TABLET, FILM COATED ORAL at 21:41

## 2017-01-11 RX ADMIN — AMIODARONE HYDROCHLORIDE 400 MILLIGRAM(S): 400 TABLET ORAL at 19:37

## 2017-01-11 RX ADMIN — HEPARIN SODIUM 5000 UNIT(S): 5000 INJECTION INTRAVENOUS; SUBCUTANEOUS at 21:41

## 2017-01-11 RX ADMIN — FENTANYL CITRATE 25 MICROGRAM(S): 50 INJECTION INTRAVENOUS at 02:45

## 2017-01-11 RX ADMIN — Medication 400 MILLIGRAM(S): at 08:15

## 2017-01-11 RX ADMIN — HEPARIN SODIUM 5000 UNIT(S): 5000 INJECTION INTRAVENOUS; SUBCUTANEOUS at 14:19

## 2017-01-11 RX ADMIN — SENNA PLUS 2 TABLET(S): 8.6 TABLET ORAL at 21:41

## 2017-01-11 RX ADMIN — FENTANYL CITRATE 25 MICROGRAM(S): 50 INJECTION INTRAVENOUS at 03:00

## 2017-01-11 RX ADMIN — FAMOTIDINE 20 MILLIGRAM(S): 10 INJECTION INTRAVENOUS at 06:00

## 2017-01-11 RX ADMIN — Medication 100 MILLIGRAM(S): at 23:57

## 2017-01-11 NOTE — PROGRESS NOTE ADULT - ASSESSMENT
77yo with h/o CAD s/p CABG X4 2005, ascending aortic aneurysm s/p ascending and hemiarch replacement.  POD #1 doing well    Problems  1. s/p Ascending and hemiarch aorta replacment  2. post op PAF  3. Hemodynamic instability  4. h/o PAUL  5.  h/o lumbar stenosis  6. recent history of ischemic CVA without deficit    Plan  Neuro - history of recent stroke, clinically doing well, post op no deficit  CVS - hemodynamic support  Afib management  off vaso since early morning will monitor  restart ASA, statin and low dose BB if BP stable by the end of today  Pulm - continue to monitor resp status pt extubated  @ 6 am  h/o Reactive airway disease but currently not on treatment  CPAP at night for h/o PAUL  GI - diet, bowel regimen, GI proph   - Cr stable, diuresis if BP allows  Heme - DVT proph  Endo - glycemic control    Critical care time spent 45 min

## 2017-01-11 NOTE — PHYSICAL THERAPY INITIAL EVALUATION ADULT - PERTINENT HX OF CURRENT PROBLEM, REHAB EVAL
76 year old male presented for elective operation for thoracic aortic aneurysm. He was suppose to undergo the operation in November 2016 but during his pre-operative cardiac catheterization he had a post procedural ischemic stroke. Elective procedure was post-poned 6 weeks Patient states that he has no residual neuro deficit.

## 2017-01-11 NOTE — PROGRESS NOTE ADULT - SUBJECTIVE AND OBJECTIVE BOX
POD #1 s/p Ascending aorta/hemiarch, re-implanted coronaries     Extubated this AM  Pressors weaned  Left fem A line removed  Rate controlled AFib this AM s/p Amio 150mg bolus    PMH :  Prostate cancer  CVA (cerebral vascular accident)  Cervical stenosis of spine  Hiatal hernia  Temporal arteritis  Obstructive sleep apnea  Hyperlipidemia  Hypertension  Asthma  Ascending aortic aneurysm  History of tonsillectomy  H/O: knee surgery  H/O prostatectomy  S/P CABG (coronary artery bypass graft)    ROS no complaint, feels   All other systems reviewed and negative.    ICU Vital Signs Last 24 Hrs  T(C): 37.2, Max: 37.2 (01-11 @ 11:00)  T(F): 98.9, Max: 98.9 (01-11 @ 11:00)  HR: 64 (54 - 94)  BP: 98/48 (98/48 - 105/54)  BP(mean): 68 (67 - 68)  ABP: 92/38 (88/52 - 146/68)  ABP(mean): 56 (50 - 94)  RR: 17 (0 - 17)  SpO2: 95% (95% - 100%)    I&O's Summary  I & Os for 24h ending 11 Jan 2017 07:00  =============================================  IN: 1744 ml / OUT: 2032 ml / NET: -288 ml    I & Os for current day (as of 11 Jan 2017 12:10)  =============================================  IN: 62 ml / OUT: 364 ml / NET: -302 ml    Mode: CPAP with PS  FiO2: 40  PEEP: 5  PS: 12  MAP: 9  PIP: 18    ABG - ( 11 Jan 2017 11:06 )  pH: 7.41  /  pCO2: 37    /  pO2: 87    / HCO3: 23    / Base Excess: -1.7  /  SaO2: 96                            9.6    8.1   )-----------( 114      ( 11 Jan 2017 10:50 )             28.9     11 Jan 2017 10:50    146    |  113    |  17     ----------------------------<  126    3.8     |  24     |  1.02     Ca    8.0        11 Jan 2017 10:50  Mg     2.2       11 Jan 2017 03:41    TPro  6.0    /  Alb  3.0    /  TBili  0.8    /  DBili  x      /  AST  11     /  ALT  16     /  AlkPhos  68     10 Sanket 2017 05:24    PT/INR - ( 11 Jan 2017 03:41 )   PT: 14.3 sec;   INR: 1.29     PTT - ( 11 Jan 2017 03:41 )  PTT:29.1 sec    MEDICATIONS  (STANDING):  ceFAZolin   IVPB 2000milliGRAM(s) IV Intermittent every 8 hours  aspirin enteric coated 81milliGRAM(s) Oral daily  heparin  Injectable 5000Unit(s) SubCutaneous every 8 hours  docusate sodium 100milliGRAM(s) Oral three times a day  senna 2Tablet(s) Oral at bedtime  atorvastatin 40milliGRAM(s) Oral at bedtime  amiodarone    Tablet 400milliGRAM(s) Oral every 8 hours  famotidine    Tablet 20milliGRAM(s) Oral two times a day  insulin lispro (HumaLOG) corrective regimen sliding scale  SubCutaneous Before meals and at bedtime  potassium chloride  20 mEq/100 mL IVPB 20milliEquivalent(s) IV Intermittent once    PHYSICAL EXAM:  Gen : no acute distress  Respiratory: decreased in the bases  Cardiovascular: S1 and S2, RRR, no M/G/R  Gastrointestinal: BS+, soft, NT/ND  Extremities: No peripheral edema  Vascular: 2+ peripheral pulses  Neurological: A/O x 3, no focal deficits  Incision: clean dry/ no sign of infection  Lines: no sign of infection

## 2017-01-11 NOTE — PHYSICAL THERAPY INITIAL EVALUATION ADULT - GENERAL OBSERVATIONS, REHAB EVAL
patient received in reverse trendelenburg with no acute distress. +EKG, +temporary pacemaker, +yasemin to wall suction x 4, +NC 6L/min, +heplock x 3, +central line, +vicente, +SCDs, +holbrook, +CVP

## 2017-01-12 LAB
ANION GAP SERPL CALC-SCNC: 11 MMOL/L — SIGNIFICANT CHANGE UP (ref 9–16)
ANION GAP SERPL CALC-SCNC: 7 MMOL/L — LOW (ref 9–16)
APTT BLD: 29.7 SEC — SIGNIFICANT CHANGE UP (ref 27.5–37.4)
BASE EXCESS BLDA CALC-SCNC: -0.2 MMOL/L — SIGNIFICANT CHANGE UP (ref -2–3)
BASE EXCESS BLDA CALC-SCNC: -0.9 MMOL/L — SIGNIFICANT CHANGE UP (ref -2–3)
BUN SERPL-MCNC: 18 MG/DL — SIGNIFICANT CHANGE UP (ref 7–23)
BUN SERPL-MCNC: 23 MG/DL — SIGNIFICANT CHANGE UP (ref 7–23)
CALCIUM SERPL-MCNC: 7.1 MG/DL — LOW (ref 8.5–10.5)
CALCIUM SERPL-MCNC: 7.6 MG/DL — LOW (ref 8.5–10.5)
CHLORIDE SERPL-SCNC: 109 MMOL/L — HIGH (ref 96–108)
CHLORIDE SERPL-SCNC: 110 MMOL/L — HIGH (ref 96–108)
CO2 SERPL-SCNC: 21 MMOL/L — LOW (ref 22–31)
CO2 SERPL-SCNC: 25 MMOL/L — SIGNIFICANT CHANGE UP (ref 22–31)
CREAT SERPL-MCNC: 0.9 MG/DL — SIGNIFICANT CHANGE UP (ref 0.5–1.3)
CREAT SERPL-MCNC: 0.97 MG/DL — SIGNIFICANT CHANGE UP (ref 0.5–1.3)
GAS PNL BLDA: SIGNIFICANT CHANGE UP
GAS PNL BLDA: SIGNIFICANT CHANGE UP
GLUCOSE SERPL-MCNC: 152 MG/DL — HIGH (ref 70–99)
GLUCOSE SERPL-MCNC: 160 MG/DL — HIGH (ref 70–99)
HCO3 BLDA-SCNC: 21 MMOL/L — SIGNIFICANT CHANGE UP (ref 21–28)
HCO3 BLDA-SCNC: 23 MMOL/L — SIGNIFICANT CHANGE UP (ref 21–28)
HCT VFR BLD CALC: 26.7 % — LOW (ref 39–50)
HCT VFR BLD CALC: 28.5 % — LOW (ref 39–50)
HGB BLD-MCNC: 9.2 G/DL — LOW (ref 13–17)
HGB BLD-MCNC: 9.5 G/DL — LOW (ref 13–17)
INR BLD: 1.38 — HIGH (ref 0.88–1.16)
MAGNESIUM SERPL-MCNC: 2 MG/DL — SIGNIFICANT CHANGE UP (ref 1.6–2.4)
MAGNESIUM SERPL-MCNC: 2.4 MG/DL — SIGNIFICANT CHANGE UP (ref 1.6–2.4)
MCHC RBC-ENTMCNC: 26.7 PG — LOW (ref 27–34)
MCHC RBC-ENTMCNC: 27.5 PG — SIGNIFICANT CHANGE UP (ref 27–34)
MCHC RBC-ENTMCNC: 33.3 G/DL — SIGNIFICANT CHANGE UP (ref 32–36)
MCHC RBC-ENTMCNC: 34.5 G/DL — SIGNIFICANT CHANGE UP (ref 32–36)
MCV RBC AUTO: 79.7 FL — LOW (ref 80–100)
MCV RBC AUTO: 80.1 FL — SIGNIFICANT CHANGE UP (ref 80–100)
PCO2 BLDA: 27 MMHG — LOW (ref 35–48)
PCO2 BLDA: 30 MMHG — LOW (ref 35–48)
PH BLDA: 7.49 — HIGH (ref 7.35–7.45)
PH BLDA: 7.51 — HIGH (ref 7.35–7.45)
PHOSPHATE SERPL-MCNC: 2.6 MG/DL — SIGNIFICANT CHANGE UP (ref 2.5–4.5)
PLATELET # BLD AUTO: 114 K/UL — LOW (ref 150–400)
PLATELET # BLD AUTO: 99 K/UL — LOW (ref 150–400)
PO2 BLDA: 69 MMHG — LOW (ref 83–108)
PO2 BLDA: 76 MMHG — LOW (ref 83–108)
POTASSIUM SERPL-MCNC: 3.3 MMOL/L — LOW (ref 3.5–5.3)
POTASSIUM SERPL-MCNC: 3.8 MMOL/L — SIGNIFICANT CHANGE UP (ref 3.5–5.3)
POTASSIUM SERPL-SCNC: 3.3 MMOL/L — LOW (ref 3.5–5.3)
POTASSIUM SERPL-SCNC: 3.8 MMOL/L — SIGNIFICANT CHANGE UP (ref 3.5–5.3)
PROTHROM AB SERPL-ACNC: 15.4 SEC — HIGH (ref 10–13.1)
RBC # BLD: 3.35 M/UL — LOW (ref 4.2–5.8)
RBC # BLD: 3.56 M/UL — LOW (ref 4.2–5.8)
RBC # FLD: 14.9 % — SIGNIFICANT CHANGE UP (ref 10.3–16.9)
RBC # FLD: 15.2 % — SIGNIFICANT CHANGE UP (ref 10.3–16.9)
SAO2 % BLDA: 94 % — LOW (ref 95–100)
SAO2 % BLDA: 96 % — SIGNIFICANT CHANGE UP (ref 95–100)
SODIUM SERPL-SCNC: 141 MMOL/L — SIGNIFICANT CHANGE UP (ref 135–145)
SODIUM SERPL-SCNC: 142 MMOL/L — SIGNIFICANT CHANGE UP (ref 135–145)
WBC # BLD: 8.1 K/UL — SIGNIFICANT CHANGE UP (ref 3.8–10.5)
WBC # BLD: 8.2 K/UL — SIGNIFICANT CHANGE UP (ref 3.8–10.5)
WBC # FLD AUTO: 8.1 K/UL — SIGNIFICANT CHANGE UP (ref 3.8–10.5)
WBC # FLD AUTO: 8.2 K/UL — SIGNIFICANT CHANGE UP (ref 3.8–10.5)

## 2017-01-12 PROCEDURE — 99233 SBSQ HOSP IP/OBS HIGH 50: CPT

## 2017-01-12 PROCEDURE — 71010: CPT | Mod: 26

## 2017-01-12 PROCEDURE — 99291 CRITICAL CARE FIRST HOUR: CPT

## 2017-01-12 RX ORDER — MAGNESIUM SULFATE 500 MG/ML
2 VIAL (ML) INJECTION ONCE
Qty: 0 | Refills: 0 | Status: COMPLETED | OUTPATIENT
Start: 2017-01-12 | End: 2017-01-12

## 2017-01-12 RX ORDER — IPRATROPIUM BROMIDE 0.2 MG/ML
500 SOLUTION, NON-ORAL INHALATION ONCE
Qty: 0 | Refills: 0 | Status: COMPLETED | OUTPATIENT
Start: 2017-01-12 | End: 2017-01-12

## 2017-01-12 RX ORDER — FUROSEMIDE 40 MG
20 TABLET ORAL ONCE
Qty: 0 | Refills: 0 | Status: COMPLETED | OUTPATIENT
Start: 2017-01-12 | End: 2017-01-12

## 2017-01-12 RX ORDER — POTASSIUM CHLORIDE 20 MEQ
20 PACKET (EA) ORAL ONCE
Qty: 0 | Refills: 0 | Status: COMPLETED | OUTPATIENT
Start: 2017-01-12 | End: 2017-01-12

## 2017-01-12 RX ORDER — FLUOXETINE HCL 10 MG
40 CAPSULE ORAL DAILY
Qty: 0 | Refills: 0 | Status: DISCONTINUED | OUTPATIENT
Start: 2017-01-12 | End: 2017-01-19

## 2017-01-12 RX ORDER — POTASSIUM PHOSPHATE, MONOBASIC POTASSIUM PHOSPHATE, DIBASIC 236; 224 MG/ML; MG/ML
15 INJECTION, SOLUTION INTRAVENOUS ONCE
Qty: 0 | Refills: 0 | Status: COMPLETED | OUTPATIENT
Start: 2017-01-12 | End: 2017-01-12

## 2017-01-12 RX ORDER — ALBUMIN HUMAN 25 %
250 VIAL (ML) INTRAVENOUS ONCE
Qty: 0 | Refills: 0 | Status: COMPLETED | OUTPATIENT
Start: 2017-01-12 | End: 2017-01-12

## 2017-01-12 RX ORDER — POTASSIUM CHLORIDE 20 MEQ
20 PACKET (EA) ORAL
Qty: 0 | Refills: 0 | Status: COMPLETED | OUTPATIENT
Start: 2017-01-12 | End: 2017-01-12

## 2017-01-12 RX ORDER — BUDESONIDE, MICRONIZED 100 %
0.5 POWDER (GRAM) MISCELLANEOUS EVERY 12 HOURS
Qty: 0 | Refills: 0 | Status: DISCONTINUED | OUTPATIENT
Start: 2017-01-12 | End: 2017-01-22

## 2017-01-12 RX ORDER — ALBUMIN HUMAN 25 %
250 VIAL (ML) INTRAVENOUS ONCE
Qty: 0 | Refills: 0 | Status: DISCONTINUED | OUTPATIENT
Start: 2017-01-12 | End: 2017-01-13

## 2017-01-12 RX ADMIN — Medication 12.5 MILLIGRAM(S): at 17:03

## 2017-01-12 RX ADMIN — FAMOTIDINE 20 MILLIGRAM(S): 10 INJECTION INTRAVENOUS at 06:18

## 2017-01-12 RX ADMIN — Medication 100 MILLIGRAM(S): at 06:18

## 2017-01-12 RX ADMIN — Medication 12.5 MILLIGRAM(S): at 06:18

## 2017-01-12 RX ADMIN — Medication 100 MILLIEQUIVALENT(S): at 19:00

## 2017-01-12 RX ADMIN — Medication 20 MILLIGRAM(S): at 13:00

## 2017-01-12 RX ADMIN — Medication 50 GRAM(S): at 10:11

## 2017-01-12 RX ADMIN — AMIODARONE HYDROCHLORIDE 400 MILLIGRAM(S): 400 TABLET ORAL at 03:31

## 2017-01-12 RX ADMIN — Medication 125 MILLILITER(S): at 22:32

## 2017-01-12 RX ADMIN — POTASSIUM PHOSPHATE, MONOBASIC POTASSIUM PHOSPHATE, DIBASIC 63.75 MILLIMOLE(S): 236; 224 INJECTION, SOLUTION INTRAVENOUS at 06:18

## 2017-01-12 RX ADMIN — Medication 500 MICROGRAM(S): at 06:21

## 2017-01-12 RX ADMIN — Medication 2: at 07:58

## 2017-01-12 RX ADMIN — Medication 0.5 MILLIGRAM(S): at 06:20

## 2017-01-12 RX ADMIN — Medication 50 MILLIEQUIVALENT(S): at 03:08

## 2017-01-12 RX ADMIN — Medication 81 MILLIGRAM(S): at 11:34

## 2017-01-12 RX ADMIN — AMIODARONE HYDROCHLORIDE 400 MILLIGRAM(S): 400 TABLET ORAL at 20:03

## 2017-01-12 RX ADMIN — HEPARIN SODIUM 5000 UNIT(S): 5000 INJECTION INTRAVENOUS; SUBCUTANEOUS at 06:18

## 2017-01-12 RX ADMIN — Medication 40 MILLIGRAM(S): at 12:21

## 2017-01-12 RX ADMIN — Medication 50 MILLIEQUIVALENT(S): at 01:55

## 2017-01-12 RX ADMIN — Medication 100 MILLIEQUIVALENT(S): at 17:00

## 2017-01-12 RX ADMIN — Medication 100 MILLIEQUIVALENT(S): at 16:00

## 2017-01-12 RX ADMIN — FAMOTIDINE 20 MILLIGRAM(S): 10 INJECTION INTRAVENOUS at 17:03

## 2017-01-12 RX ADMIN — LIDOCAINE 1 PATCH: 4 CREAM TOPICAL at 05:30

## 2017-01-12 RX ADMIN — ATORVASTATIN CALCIUM 40 MILLIGRAM(S): 80 TABLET, FILM COATED ORAL at 22:32

## 2017-01-12 RX ADMIN — HEPARIN SODIUM 5000 UNIT(S): 5000 INJECTION INTRAVENOUS; SUBCUTANEOUS at 14:56

## 2017-01-12 RX ADMIN — Medication 100 MILLIGRAM(S): at 08:07

## 2017-01-12 RX ADMIN — Medication 20 MILLIGRAM(S): at 05:05

## 2017-01-12 RX ADMIN — AMIODARONE HYDROCHLORIDE 400 MILLIGRAM(S): 400 TABLET ORAL at 11:34

## 2017-01-12 RX ADMIN — Medication 2: at 11:33

## 2017-01-12 RX ADMIN — HEPARIN SODIUM 5000 UNIT(S): 5000 INJECTION INTRAVENOUS; SUBCUTANEOUS at 22:32

## 2017-01-12 NOTE — DIETITIAN INITIAL EVALUATION ADULT. - OTHER INFO
S/p Re-op Sternotomy for aortic hemiarch replacement. Pt seen on NC, OOB in chair. Wife at bedside reports that PTA, pt was following a very balanced, heart healthy diet; Whole grains, lean protein (mainly chicken), fruits/veggies, and limiting fat. Provided diet reinforcement and answered questions. Post-op, pt reports decreased appetite with fair intake; consumed ~50% of breakfast this morning. Denies N/V/D and mechanical issues. C/o slight C; continue bowel regimen and encouraged fluid/fiber intake. Will follow.

## 2017-01-12 NOTE — PROGRESS NOTE ADULT - SUBJECTIVE AND OBJECTIVE BOX
INTERVAL HPI/OVERNIGHT EVENTS:    POD #2 s/p Ascending aorta/hemiarch, re-implanted coronaries     75yo male with Hx CAD/CABG x4 '05, asthma, COPD, HTN, chol, PAUL on qhs CPAP, Temporal arteritis, hiatal hernia, cervical and lumbar stenosis, CVA, prostate cancer s/p radial prostatectomy '03 and known thoracic aortic aneurysm presenting for elective repair    aneurysm diagnosed incidentally on routine diagnostic imaging and followed with serial imaging.  CT Chest (6/28/16): ascending aorta (6.2. x 5.9cm); aortic arch 4.9cm descending aorta 4 x3.6cm and abdominal aorta 4.2cm.   initial planned procedure in Nov '16 deferred - preop cath (patent grafts) complicated by post procedural ischemic stroke(w/o residual neuro deficits) - Elective procedure postponed 6 weeks     to OR 1/10 - reop sternotomy - ascending aortic/hemiarch replacement with reimplantation of coronaries    Extubated 1/11 and pressors weaned off in short post-op period  noted post-op atrial fib - amiodarone and metoprolol started  pacing wires in place  per d/w surgery - noted bradycardia prior to procedure ?tachy/julia    No acute events reported overnight - HR improved - sinus/PAC noted  currently OOB to chair        PMHx includes but is not limited to:  CAD Hx CABG  Prostate cancer Hx prostatectomy  CVA - no residual deficits  Cervical stenosis of spine  Hiatal hernia  Temporal arteritis  Obstructive sleep apnea on CPAP  Hyperlipidemia  Hypertension  Asthma  Ascending aortic aneurysm  History of tonsillectomy  H/O: knee surgery    ICU Vital Signs Last 24 Hrs  T(C): 36.3, Max: 38.2 (01-11 @ 20:00)  T(F): 97.3, Max: 100.7 (01-11 @ 20:00)  HR: 86 (64 - 122)  BP: 120/51 (98/48 - 120/51)  BP(mean): 80 (67 - 80)  ABP: 108/60 (92/38 - 158/64)  ABP(mean): 76 (50 - 90)  RR: 26 (0 - 26)  SpO2: 94% (92% - 97%)    Qtts:     I&O's Summary          Physical Exam    Heart  Lungs  Abd  Ext  Chest  Neuro  Skin    LABS:                        9.5    8.1   )-----------( 114      ( 12 Jan 2017 03:44 )             28.5     12 Jan 2017 03:44    142    |  110    |  18     ----------------------------<  160    3.8     |  25     |  0.97     Ca    7.6        12 Jan 2017 03:44  Phos  2.6       12 Jan 2017 03:44  Mg     2.0       12 Jan 2017 03:44      PT/INR - ( 12 Jan 2017 03:44 )   PT: 15.4 sec;   INR: 1.38          PTT - ( 12 Jan 2017 03:44 )  PTT:29.7 sec    ABG - ( 12 Jan 2017 03:43 )  pH: 7.49  /  pCO2: 30    /  pO2: 69    / HCO3: 23    / Base Excess: -0.2  /  SaO2: 94                  RADIOLOGY & ADDITIONAL STUDIES:    CRITICAL CARE TIME SPENT: INTERVAL HPI/OVERNIGHT EVENTS:    POD #2 reop sternotomy; ascending aorta/hemiarch replacement with re-implantation coronaries     75yo male with Hx CAD/CABG x4 '05, asthma, COPD, HTN, chol, PAUL on qhs CPAP, Temporal arteritis, hiatal hernia, cervical and lumbar stenosis, CVA, prostate cancer s/p radial prostatectomy '03 and known thoracic aortic aneurysm presenting for elective repair    aneurysm diagnosed incidentally on routine diagnostic imaging and followed with serial imaging.  CT Chest (6/28/16): ascending aorta (6.2. x 5.9cm); aortic arch 4.9cm descending aorta 4 x3.6cm and abdominal aorta 4.2cm.   initial planned procedure in Nov '16 deferred - preop cath (patent grafts) complicated by post procedural ischemic stroke(w/o residual neuro deficits) - Elective procedure postponed 6 weeks     to OR 1/10 - reop sternotomy - ascending aortic/hemiarch replacement with reimplantation of coronaries    Extubated 1/11 and pressors weaned off in short post-op period  noted post-op atrial fib - amiodarone and metoprolol started  pacing wires in place  per d/w surgery - noted bradycardia prior to procedure ?tachy/julia    No acute events reported overnight - HR improved - sinus/PAC noted  currently OOB to chair  reports pain controlled; up and ambulating 1/11  nightly home BIPAP - patients home settings    no other problems/sxs/complaints via extensive ROS    PMHx includes but is not limited to:  CAD Hx CABG  Prostate cancer Hx prostatectomy  CVA - no residual deficits  Cervical stenosis of spine  Hiatal hernia  Temporal arteritis  Obstructive sleep apnea on CPAP  Hyperlipidemia  Hypertension  Asthma  Ascending aortic aneurysm  History of tonsillectomy  H/O: knee surgery    ICU Vital Signs Last 24 Hrs  T(C): 36.3, Max: 38.2 (01-11 @ 20:00)  T(F): 97.3, Max: 100.7 (01-11 @ 20:00)  HR: 86 (64 - 122)  BP: 120/51 (98/48 - 120/51)  BP(mean): 80 (67 - 80)  ABP: 108/60 (92/38 - 158/64)  ABP(mean): 76 (50 - 90)  SpO2: 94% (92% - 97%) on 4 L NC    Physical Exam    Heart - irregular irregular (-)m/r/g  Lungs - dec BS bases (-)wheeze/rhonchi appreciated  Abd - (+)BS soft. obese. no r/r/g  Ext - warm to touch; no c/c/e  Chest - op bandage in place; incision right upper chest - well approx/healing. no streaking/discharge/fluctuance  Neuro - alert/oriented/interactive. non-focal  Skin - no rash    LABS:                        9.5    8.1   )-----------( 114      ( 12 Jan 2017 03:44 )             28.5     12 Jan 2017 03:44    142    |  110    |  18     ----------------------------<  160    3.8     |  25     |  0.97     Ca    7.6        12 Jan 2017 03:44  Phos  2.6       12 Jan 2017 03:44  Mg     2.0       12 Jan 2017 03:44    PT/INR - ( 12 Jan 2017 03:44 )   PT: 15.4 sec;   INR: 1.38     PTT - ( 12 Jan 2017 03:44 )  PTT:29.7 sec    ABG - ( 12 Jan 2017 03:43 ) 7.49/30/69/94 - home BIPAP        RADIOLOGY reviewed    Patient with multiple medical problems now POD #2 elective Ascending aorta/hemiarch replacement for aneurysm with re-implantation of coronaries - doing well    1. CV  hemodynamically stable  complete periop Abx prophylaxis  ASA/Lipitor  post-op atrial fib - po Amiodarone load and Metoprolol 12.5 BID started 1/11 - titrate beta blocker as clinical scenario allows  noted concern for possible tachy-julia syndrome based on reports of pre-op bradycardia - monitor for now. if recurs will consult EP  no full AC for now    2. Pulm -   nightly BIPAP  titrate supplemental oxygen as able to maintain Sa02 93% or greater  nebs  monitor CT output  ambulation and incentive spirometry    3. Endocrine - no Hx DM  HgA1c 6.0 on 1/9  ISS prn to maintain glycemic control    restart home dosing prozac  bowel regimen - no BM since before surgery  DVT/GI prophylaxis    d/w patient/wife present in room; staff and CTS    CRITICAL CARE TIME SPENT: 60 min

## 2017-01-12 NOTE — DIETITIAN INITIAL EVALUATION ADULT. - ENERGY NEEDS
IBW 80.9Kg  %%  BMI 34.7    Utilized IBW to calculate needs, >120% of IBW. Needs increased for post-op.

## 2017-01-13 LAB
ANION GAP SERPL CALC-SCNC: 8 MMOL/L — LOW (ref 9–16)
APTT BLD: 31.1 SEC — SIGNIFICANT CHANGE UP (ref 27.5–37.4)
BUN SERPL-MCNC: 25 MG/DL — HIGH (ref 7–23)
CALCIUM SERPL-MCNC: 7.4 MG/DL — LOW (ref 8.5–10.5)
CHLORIDE SERPL-SCNC: 107 MMOL/L — SIGNIFICANT CHANGE UP (ref 96–108)
CO2 SERPL-SCNC: 25 MMOL/L — SIGNIFICANT CHANGE UP (ref 22–31)
CREAT SERPL-MCNC: 0.89 MG/DL — SIGNIFICANT CHANGE UP (ref 0.5–1.3)
GAS PNL BLDA: SIGNIFICANT CHANGE UP
GLUCOSE SERPL-MCNC: 130 MG/DL — HIGH (ref 70–99)
HCO3 BLDA-SCNC: 23 MMOL/L — SIGNIFICANT CHANGE UP (ref 21–28)
HCT VFR BLD CALC: 25.6 % — LOW (ref 39–50)
HGB BLD-MCNC: 8.4 G/DL — LOW (ref 13–17)
INR BLD: 1.24 — HIGH (ref 0.88–1.16)
MAGNESIUM SERPL-MCNC: 2.5 MG/DL — HIGH (ref 1.6–2.4)
MCHC RBC-ENTMCNC: 26.4 PG — LOW (ref 27–34)
MCHC RBC-ENTMCNC: 32.8 G/DL — SIGNIFICANT CHANGE UP (ref 32–36)
MCV RBC AUTO: 80.5 FL — SIGNIFICANT CHANGE UP (ref 80–100)
PCO2 BLDA: 29 MMHG — LOW (ref 35–48)
PH BLDA: 7.51 — HIGH (ref 7.35–7.45)
PHOSPHATE SERPL-MCNC: 1.7 MG/DL — LOW (ref 2.5–4.5)
PLATELET # BLD AUTO: 107 K/UL — LOW (ref 150–400)
PO2 BLDA: 74 MMHG — LOW (ref 83–108)
POTASSIUM SERPL-MCNC: 3.4 MMOL/L — LOW (ref 3.5–5.3)
POTASSIUM SERPL-SCNC: 3.4 MMOL/L — LOW (ref 3.5–5.3)
PROTHROM AB SERPL-ACNC: 13.8 SEC — HIGH (ref 10–13.1)
RBC # BLD: 3.18 M/UL — LOW (ref 4.2–5.8)
RBC # FLD: 14.9 % — SIGNIFICANT CHANGE UP (ref 10.3–16.9)
SAO2 % BLDA: 95 % — SIGNIFICANT CHANGE UP (ref 95–100)
SODIUM SERPL-SCNC: 140 MMOL/L — SIGNIFICANT CHANGE UP (ref 135–145)
SURGICAL PATHOLOGY STUDY: SIGNIFICANT CHANGE UP
WBC # BLD: 7.3 K/UL — SIGNIFICANT CHANGE UP (ref 3.8–10.5)
WBC # FLD AUTO: 7.3 K/UL — SIGNIFICANT CHANGE UP (ref 3.8–10.5)

## 2017-01-13 PROCEDURE — 99291 CRITICAL CARE FIRST HOUR: CPT

## 2017-01-13 PROCEDURE — 71010: CPT | Mod: 26,76

## 2017-01-13 RX ORDER — POTASSIUM CHLORIDE 20 MEQ
20 PACKET (EA) ORAL
Qty: 0 | Refills: 0 | Status: COMPLETED | OUTPATIENT
Start: 2017-01-13 | End: 2017-01-13

## 2017-01-13 RX ORDER — ACETAMINOPHEN 500 MG
650 TABLET ORAL EVERY 6 HOURS
Qty: 0 | Refills: 0 | Status: DISCONTINUED | OUTPATIENT
Start: 2017-01-13 | End: 2017-01-17

## 2017-01-13 RX ORDER — SIMETHICONE 80 MG/1
80 TABLET, CHEWABLE ORAL ONCE
Qty: 0 | Refills: 0 | Status: COMPLETED | OUTPATIENT
Start: 2017-01-13 | End: 2017-01-13

## 2017-01-13 RX ORDER — METOPROLOL TARTRATE 50 MG
12.5 TABLET ORAL EVERY 6 HOURS
Qty: 0 | Refills: 0 | Status: DISCONTINUED | OUTPATIENT
Start: 2017-01-13 | End: 2017-01-17

## 2017-01-13 RX ORDER — SODIUM CHLORIDE 9 MG/ML
3 INJECTION INTRAMUSCULAR; INTRAVENOUS; SUBCUTANEOUS EVERY 8 HOURS
Qty: 0 | Refills: 0 | Status: DISCONTINUED | OUTPATIENT
Start: 2017-01-13 | End: 2017-01-16

## 2017-01-13 RX ORDER — POTASSIUM PHOSPHATE, MONOBASIC POTASSIUM PHOSPHATE, DIBASIC 236; 224 MG/ML; MG/ML
15 INJECTION, SOLUTION INTRAVENOUS ONCE
Qty: 0 | Refills: 0 | Status: COMPLETED | OUTPATIENT
Start: 2017-01-13 | End: 2017-01-13

## 2017-01-13 RX ORDER — FUROSEMIDE 40 MG
20 TABLET ORAL ONCE
Qty: 0 | Refills: 0 | Status: COMPLETED | OUTPATIENT
Start: 2017-01-13 | End: 2017-01-13

## 2017-01-13 RX ADMIN — AMIODARONE HYDROCHLORIDE 400 MILLIGRAM(S): 400 TABLET ORAL at 06:43

## 2017-01-13 RX ADMIN — Medication 50 MILLIEQUIVALENT(S): at 10:16

## 2017-01-13 RX ADMIN — POTASSIUM PHOSPHATE, MONOBASIC POTASSIUM PHOSPHATE, DIBASIC 63.75 MILLIMOLE(S): 236; 224 INJECTION, SOLUTION INTRAVENOUS at 03:58

## 2017-01-13 RX ADMIN — Medication 81 MILLIGRAM(S): at 11:08

## 2017-01-13 RX ADMIN — FAMOTIDINE 20 MILLIGRAM(S): 10 INJECTION INTRAVENOUS at 20:24

## 2017-01-13 RX ADMIN — Medication 12.5 MILLIGRAM(S): at 18:57

## 2017-01-13 RX ADMIN — SIMETHICONE 80 MILLIGRAM(S): 80 TABLET, CHEWABLE ORAL at 11:08

## 2017-01-13 RX ADMIN — Medication 12.5 MILLIGRAM(S): at 11:08

## 2017-01-13 RX ADMIN — FAMOTIDINE 20 MILLIGRAM(S): 10 INJECTION INTRAVENOUS at 06:44

## 2017-01-13 RX ADMIN — AMIODARONE HYDROCHLORIDE 400 MILLIGRAM(S): 400 TABLET ORAL at 21:16

## 2017-01-13 RX ADMIN — Medication 20 MILLIGRAM(S): at 11:07

## 2017-01-13 RX ADMIN — Medication 0.5 MILLIGRAM(S): at 06:00

## 2017-01-13 RX ADMIN — SODIUM CHLORIDE 3 MILLILITER(S): 9 INJECTION INTRAMUSCULAR; INTRAVENOUS; SUBCUTANEOUS at 13:27

## 2017-01-13 RX ADMIN — HEPARIN SODIUM 5000 UNIT(S): 5000 INJECTION INTRAVENOUS; SUBCUTANEOUS at 06:43

## 2017-01-13 RX ADMIN — SODIUM CHLORIDE 3 MILLILITER(S): 9 INJECTION INTRAMUSCULAR; INTRAVENOUS; SUBCUTANEOUS at 06:00

## 2017-01-13 RX ADMIN — Medication 650 MILLIGRAM(S): at 21:10

## 2017-01-13 RX ADMIN — Medication 20 MILLIGRAM(S): at 01:30

## 2017-01-13 RX ADMIN — Medication 12.5 MILLIGRAM(S): at 06:43

## 2017-01-13 RX ADMIN — AMIODARONE HYDROCHLORIDE 400 MILLIGRAM(S): 400 TABLET ORAL at 13:29

## 2017-01-13 RX ADMIN — Medication 650 MILLIGRAM(S): at 04:30

## 2017-01-13 RX ADMIN — Medication 50 MILLIEQUIVALENT(S): at 04:21

## 2017-01-13 RX ADMIN — Medication 50 MILLIEQUIVALENT(S): at 09:00

## 2017-01-13 RX ADMIN — HEPARIN SODIUM 5000 UNIT(S): 5000 INJECTION INTRAVENOUS; SUBCUTANEOUS at 13:29

## 2017-01-13 RX ADMIN — Medication 650 MILLIGRAM(S): at 18:55

## 2017-01-13 RX ADMIN — SODIUM CHLORIDE 3 MILLILITER(S): 9 INJECTION INTRAMUSCULAR; INTRAVENOUS; SUBCUTANEOUS at 21:10

## 2017-01-13 RX ADMIN — HEPARIN SODIUM 5000 UNIT(S): 5000 INJECTION INTRAVENOUS; SUBCUTANEOUS at 21:16

## 2017-01-13 NOTE — CHART NOTE - NSCHARTNOTEFT_GEN_A_CORE
Both pleural chest tubes and one out of 2 mediastinal tubes removed.  One mediastinal tube remains since his epicardial wires are still in.  Will F/U with Dr. Villalba re: when to D/C his remaining mediastinal tube and wires.  Minimal drainage.  CXR ordered.  Patient tolerated procedure well. Both pleural chest tubes and one out of 2 mediastinal tubes removed.  One mediastinal tube remains since his epicardial wires are still in.  Will F/U with Dr. Villalba re: when to D/C his remaining mediastinal tube and wires.  Minimal drainage.  CXR ordered.  Patient tolerated procedure well.    Addendum: CXR reviewed, no pneumothorax or new collections.

## 2017-01-13 NOTE — PROGRESS NOTE ADULT - SUBJECTIVE AND OBJECTIVE BOX
INTERVAL HPI/OVERNIGHT EVENTS:    POD #3 reop sternotomy; ascending aorta/hemiarch replacement with re-implantation coronaries     77yo male with Hx CAD/CABG x4 '05, asthma, COPD, HTN, chol, PAUL on qhs CPAP, Temporal arteritis, hiatal hernia, cervical and lumbar stenosis, CVA, prostate cancer s/p radial prostatectomy '03 and known thoracic aortic aneurysm presenting for elective repair    aneurysm diagnosed incidentally on routine diagnostic imaging and followed with serial imaging.  CT Chest (6/28/16): ascending aorta (6.2. x 5.9cm); aortic arch 4.9cm descending aorta 4 x3.6cm and abdominal aorta 4.2cm.   initial planned procedure in Nov '16 deferred - preop cath (patent grafts) complicated by post procedural ischemic stroke(w/o residual neuro deficits) - Elective procedure postponed 6 weeks     to OR 1/10 - reop sternotomy - ascending aortic/hemiarch replacement with reimplantation of coronaries    Extubated 1/11 and pressors weaned off in short post-op period  noted post-op atrial fib - amiodarone and metoprolol started  pacing wires in place  nightly home BIPAP - patients home settings    No acute events reported overnight    PMHx includes but is not limited to:  CAD Hx CABG  Prostate cancer Hx prostatectomy  CVA - no residual deficits  Cervical stenosis of spine  Hiatal hernia  Temporal arteritis  Obstructive sleep apnea on CPAP  Hyperlipidemia  Hypertension  Asthma  Ascending aortic aneurysm  History of tonsillectomy  H/O: knee surgery    ICU Vital Signs Last 24 Hrs  T(C): 36.6, Max: 37.2 (01-12 @ 18:18)  T(F): 97.9, Max: 99 (01-12 @ 18:18)  HR: 74 (68 - 103)  BP: 110/56 (98/47 - 119/60)  BP(mean): 73 (68 - 83)  ABP: 122/50 (90/37 - 150/62)  ABP(mean): 76 (54 - 90)  RR: 9 (9 - 35)  SpO2: 96% (93% - 97%)    Qtts:     I&O's Summary          Physical Exam    Heart  Lungs  Abd  Ext  Chest  Neuro  Skin    LABS:                        8.4    7.3   )-----------( 107      ( 13 Jan 2017 02:19 )             25.6     13 Jan 2017 02:19    140    |  107    |  25     ----------------------------<  130    3.4     |  25     |  0.89     Ca    7.4        13 Jan 2017 02:19  Phos  1.7       13 Jan 2017 02:19  Mg     2.5       13 Jan 2017 02:19      PT/INR - ( 13 Jan 2017 02:19 )   PT: 13.8 sec;   INR: 1.24          PTT - ( 13 Jan 2017 02:19 )  PTT:31.1 sec    ABG - ( 13 Jan 2017 02:18 )  pH: 7.51  /  pCO2: 29    /  pO2: 74    / HCO3: 23    / Base Excess: x     /  SaO2: 95                  RADIOLOGY & ADDITIONAL STUDIES:    CRITICAL CARE TIME SPENT: INTERVAL HPI/OVERNIGHT EVENTS:    POD #3 reop sternotomy; ascending aorta/hemiarch replacement with re-implantation coronaries     75yo male with Hx CAD/CABG x4 '05, asthma, COPD, HTN, chol, PAUL on qhs CPAP, Temporal arteritis, hiatal hernia, cervical and lumbar stenosis, CVA, prostate cancer s/p radial prostatectomy '03 and known thoracic aortic aneurysm presenting for elective repair    aneurysm diagnosed incidentally on routine diagnostic imaging and followed with serial imaging.  CT Chest (6/28/16): ascending aorta (6.2. x 5.9cm); aortic arch 4.9cm descending aorta 4 x3.6cm and abdominal aorta 4.2cm.   initial planned procedure in Nov '16 deferred - preop cath (patent grafts) complicated by post procedural ischemic stroke(w/o residual neuro deficits) - Elective procedure postponed 6 weeks     to OR 1/10 - reop sternotomy - ascending aortic/hemiarch replacement with reimplantation of coronaries    Extubated 1/11 and pressors weaned off in short post-op period  noted post-op atrial fib - amiodarone and metoprolol started  pacing wires in place  nightly home BIPAP - patients home settings    No acute events reported overnight - patient currently in bed.  reports several soft BM this am and some crampy abd discomfort/gas  (+)flatus  eating/po intake improved    PMHx includes but is not limited to:  CAD Hx CABG  Prostate cancer Hx prostatectomy  CVA - no residual deficits  Cervical stenosis of spine  Hiatal hernia  Temporal arteritis  Obstructive sleep apnea on CPAP  Hyperlipidemia  Hypertension  Asthma  Ascending aortic aneurysm  History of tonsillectomy  H/O: knee surgery    ICU Vital Signs Last 24 Hrs  T(C): 36.6, Max: 37.2 (01-12 @ 18:18)  T(F): 97.9, Max: 99 (01-12 @ 18:18)  HR: 74 (68 - 103)  BP: 110/56 (98/47 - 119/60)  BP(mean): 73 (68 - 83)  ABP: 122/50 (90/37 - 150/62)  ABP(mean): 76 (54 - 90)  RR: 9 (9 - 35)  SpO2: 96% (93% - 97%) RA    Qtts: None    Physical Exam    Heart - regular (-)rub/gallop  Lungs - CTA anterior (-)r/r/w  Abd - active BS; obese; soft (-)r/r/g  Ext - warm to touch ; no cyanosis/clubbing edema  Chest - op bandage taken down - incision site clean/dry - well approx/healing - no erythema/drainage  Neuro - alert/oriented/interactive; no motor deficits   Skin - no rash    LABS:                        8.4    7.3   )-----------( 107      ( 13 Jan 2017 02:19 )             25.6     13 Jan 2017 02:19    140    |  107    |  25     ----------------------------<  130    3.4     |  25     |  0.89     Ca    7.4        13 Jan 2017 02:19  Phos  1.7       13 Jan 2017 02:19  Mg     2.5       13 Jan 2017 02:19    PT/INR - ( 13 Jan 2017 02:19 )   PT: 13.8 sec;   INR: 1.24     PTT - ( 13 Jan 2017 02:19 )  PTT:31.1 sec    ABG - ( 13 Jan 2017 02:18 ) 7.51/29/74/95    RADIOLOGY & ADDITIONAL STUDIES: reviewed    Patient now POD #3 reop sternotomy; ascending aorta/hemiarch replacement with re-implantation coronaries - doing well    1. CV -   hemodynamically stable  cont ASA/statin  amiodarone 200 po qd/metoprolol 12.5 q6h - no further atrial fib noted  pain well controlled   anticipate remove chest tubes today per Dr Villalba    2. GI -   soft BM - several this am; d/c senna and colace  simethicone given   tolerating diet and GI prophylaxis    3. Pulm - breathing stable  Hx COPD  ongoing nebs  incentive spirometry    DVT prophylaxis  ongoing ambulation/incentive spirometry    d/w patient/wife present in room, staff and Dr Villalba    CRITICAL CARE TIME SPENT: 60 min

## 2017-01-14 LAB
ALBUMIN SERPL ELPH-MCNC: 2.7 G/DL — LOW (ref 3.4–5)
ALP SERPL-CCNC: 56 U/L — SIGNIFICANT CHANGE UP (ref 40–120)
ALT FLD-CCNC: 13 U/L — SIGNIFICANT CHANGE UP (ref 12–42)
ANION GAP SERPL CALC-SCNC: 10 MMOL/L — SIGNIFICANT CHANGE UP (ref 9–16)
ANION GAP SERPL CALC-SCNC: 8 MMOL/L — LOW (ref 9–16)
APTT BLD: 30.4 SEC — SIGNIFICANT CHANGE UP (ref 27.5–37.4)
APTT BLD: 37.9 SEC — HIGH (ref 27.5–37.4)
AST SERPL-CCNC: 13 U/L — LOW (ref 15–37)
BILIRUB DIRECT SERPL-MCNC: 0.21 MG/DL — HIGH
BILIRUB INDIRECT FLD-MCNC: 1.1 MG/DL — HIGH (ref 0.2–1)
BILIRUB SERPL-MCNC: 1.3 MG/DL — HIGH (ref 0.2–1.2)
BLD GP AB SCN SERPL QL: NEGATIVE — SIGNIFICANT CHANGE UP
BUN SERPL-MCNC: 20 MG/DL — SIGNIFICANT CHANGE UP (ref 7–23)
BUN SERPL-MCNC: 22 MG/DL — SIGNIFICANT CHANGE UP (ref 7–23)
CALCIUM SERPL-MCNC: 7.8 MG/DL — LOW (ref 8.5–10.5)
CALCIUM SERPL-MCNC: 8.1 MG/DL — LOW (ref 8.5–10.5)
CHLORIDE SERPL-SCNC: 106 MMOL/L — SIGNIFICANT CHANGE UP (ref 96–108)
CHLORIDE SERPL-SCNC: 108 MMOL/L — SIGNIFICANT CHANGE UP (ref 96–108)
CO2 SERPL-SCNC: 23 MMOL/L — SIGNIFICANT CHANGE UP (ref 22–31)
CO2 SERPL-SCNC: 24 MMOL/L — SIGNIFICANT CHANGE UP (ref 22–31)
CREAT SERPL-MCNC: 0.83 MG/DL — SIGNIFICANT CHANGE UP (ref 0.5–1.3)
CREAT SERPL-MCNC: 0.95 MG/DL — SIGNIFICANT CHANGE UP (ref 0.5–1.3)
GLUCOSE SERPL-MCNC: 106 MG/DL — HIGH (ref 70–99)
GLUCOSE SERPL-MCNC: 145 MG/DL — HIGH (ref 70–99)
HCT VFR BLD CALC: 23.7 % — LOW (ref 39–50)
HCT VFR BLD CALC: 25.2 % — LOW (ref 39–50)
HCT VFR BLD CALC: 26.4 % — LOW (ref 39–50)
HGB BLD-MCNC: 7.8 G/DL — LOW (ref 13–17)
HGB BLD-MCNC: 8.3 G/DL — LOW (ref 13–17)
HGB BLD-MCNC: 8.8 G/DL — LOW (ref 13–17)
INR BLD: 1.21 — HIGH (ref 0.88–1.16)
INR BLD: 1.24 — HIGH (ref 0.88–1.16)
MAGNESIUM SERPL-MCNC: 2.2 MG/DL — SIGNIFICANT CHANGE UP (ref 1.6–2.4)
MAGNESIUM SERPL-MCNC: 2.4 MG/DL — SIGNIFICANT CHANGE UP (ref 1.6–2.4)
MCHC RBC-ENTMCNC: 26.3 PG — LOW (ref 27–34)
MCHC RBC-ENTMCNC: 26.4 PG — LOW (ref 27–34)
MCHC RBC-ENTMCNC: 26.9 PG — LOW (ref 27–34)
MCHC RBC-ENTMCNC: 32.9 G/DL — SIGNIFICANT CHANGE UP (ref 32–36)
MCHC RBC-ENTMCNC: 32.9 G/DL — SIGNIFICANT CHANGE UP (ref 32–36)
MCHC RBC-ENTMCNC: 33.3 G/DL — SIGNIFICANT CHANGE UP (ref 32–36)
MCV RBC AUTO: 80 FL — SIGNIFICANT CHANGE UP (ref 80–100)
MCV RBC AUTO: 80.3 FL — SIGNIFICANT CHANGE UP (ref 80–100)
MCV RBC AUTO: 80.7 FL — SIGNIFICANT CHANGE UP (ref 80–100)
PLATELET # BLD AUTO: 130 K/UL — LOW (ref 150–400)
PLATELET # BLD AUTO: 131 K/UL — LOW (ref 150–400)
PLATELET # BLD AUTO: 145 K/UL — LOW (ref 150–400)
POTASSIUM SERPL-MCNC: 3.3 MMOL/L — LOW (ref 3.5–5.3)
POTASSIUM SERPL-MCNC: 3.7 MMOL/L — SIGNIFICANT CHANGE UP (ref 3.5–5.3)
POTASSIUM SERPL-SCNC: 3.3 MMOL/L — LOW (ref 3.5–5.3)
POTASSIUM SERPL-SCNC: 3.7 MMOL/L — SIGNIFICANT CHANGE UP (ref 3.5–5.3)
PROT SERPL-MCNC: 6.1 G/DL — LOW (ref 6.4–8.2)
PROTHROM AB SERPL-ACNC: 13.5 SEC — HIGH (ref 10–13.1)
PROTHROM AB SERPL-ACNC: 13.8 SEC — HIGH (ref 10–13.1)
RBC # BLD: 2.95 M/UL — LOW (ref 4.2–5.8)
RBC # BLD: 3.15 M/UL — LOW (ref 4.2–5.8)
RBC # BLD: 3.27 M/UL — LOW (ref 4.2–5.8)
RBC # FLD: 15.1 % — SIGNIFICANT CHANGE UP (ref 10.3–16.9)
RBC # FLD: 15.1 % — SIGNIFICANT CHANGE UP (ref 10.3–16.9)
RBC # FLD: 15.3 % — SIGNIFICANT CHANGE UP (ref 10.3–16.9)
RH IG SCN BLD-IMP: POSITIVE — SIGNIFICANT CHANGE UP
SODIUM SERPL-SCNC: 139 MMOL/L — SIGNIFICANT CHANGE UP (ref 135–145)
SODIUM SERPL-SCNC: 140 MMOL/L — SIGNIFICANT CHANGE UP (ref 135–145)
WBC # BLD: 6.3 K/UL — SIGNIFICANT CHANGE UP (ref 3.8–10.5)
WBC # BLD: 6.9 K/UL — SIGNIFICANT CHANGE UP (ref 3.8–10.5)
WBC # BLD: 7.3 K/UL — SIGNIFICANT CHANGE UP (ref 3.8–10.5)
WBC # FLD AUTO: 6.3 K/UL — SIGNIFICANT CHANGE UP (ref 3.8–10.5)
WBC # FLD AUTO: 6.9 K/UL — SIGNIFICANT CHANGE UP (ref 3.8–10.5)
WBC # FLD AUTO: 7.3 K/UL — SIGNIFICANT CHANGE UP (ref 3.8–10.5)

## 2017-01-14 PROCEDURE — 99292 CRITICAL CARE ADDL 30 MIN: CPT

## 2017-01-14 PROCEDURE — 71010: CPT | Mod: 26

## 2017-01-14 PROCEDURE — 93306 TTE W/DOPPLER COMPLETE: CPT | Mod: 26

## 2017-01-14 PROCEDURE — 99291 CRITICAL CARE FIRST HOUR: CPT

## 2017-01-14 RX ORDER — HEPARIN SODIUM 5000 [USP'U]/ML
7500 INJECTION INTRAVENOUS; SUBCUTANEOUS EVERY 8 HOURS
Qty: 0 | Refills: 0 | Status: DISCONTINUED | OUTPATIENT
Start: 2017-01-14 | End: 2017-01-22

## 2017-01-14 RX ORDER — FUROSEMIDE 40 MG
20 TABLET ORAL ONCE
Qty: 0 | Refills: 0 | Status: COMPLETED | OUTPATIENT
Start: 2017-01-14 | End: 2017-01-14

## 2017-01-14 RX ORDER — POTASSIUM CHLORIDE 20 MEQ
40 PACKET (EA) ORAL ONCE
Qty: 0 | Refills: 0 | Status: COMPLETED | OUTPATIENT
Start: 2017-01-14 | End: 2017-01-14

## 2017-01-14 RX ORDER — POTASSIUM CHLORIDE 20 MEQ
20 PACKET (EA) ORAL ONCE
Qty: 0 | Refills: 0 | Status: COMPLETED | OUTPATIENT
Start: 2017-01-14 | End: 2017-01-14

## 2017-01-14 RX ADMIN — AMIODARONE HYDROCHLORIDE 400 MILLIGRAM(S): 400 TABLET ORAL at 06:38

## 2017-01-14 RX ADMIN — HEPARIN SODIUM 7500 UNIT(S): 5000 INJECTION INTRAVENOUS; SUBCUTANEOUS at 14:14

## 2017-01-14 RX ADMIN — Medication 650 MILLIGRAM(S): at 21:47

## 2017-01-14 RX ADMIN — Medication 12.5 MILLIGRAM(S): at 18:27

## 2017-01-14 RX ADMIN — Medication 12.5 MILLIGRAM(S): at 11:42

## 2017-01-14 RX ADMIN — SODIUM CHLORIDE 3 MILLILITER(S): 9 INJECTION INTRAMUSCULAR; INTRAVENOUS; SUBCUTANEOUS at 14:15

## 2017-01-14 RX ADMIN — Medication 650 MILLIGRAM(S): at 04:32

## 2017-01-14 RX ADMIN — Medication 40 MILLIGRAM(S): at 11:42

## 2017-01-14 RX ADMIN — Medication 650 MILLIGRAM(S): at 17:00

## 2017-01-14 RX ADMIN — Medication 650 MILLIGRAM(S): at 16:00

## 2017-01-14 RX ADMIN — SODIUM CHLORIDE 3 MILLILITER(S): 9 INJECTION INTRAMUSCULAR; INTRAVENOUS; SUBCUTANEOUS at 06:42

## 2017-01-14 RX ADMIN — SODIUM CHLORIDE 3 MILLILITER(S): 9 INJECTION INTRAMUSCULAR; INTRAVENOUS; SUBCUTANEOUS at 21:47

## 2017-01-14 RX ADMIN — HEPARIN SODIUM 7500 UNIT(S): 5000 INJECTION INTRAVENOUS; SUBCUTANEOUS at 21:46

## 2017-01-14 RX ADMIN — Medication 12.5 MILLIGRAM(S): at 00:19

## 2017-01-14 RX ADMIN — Medication 12.5 MILLIGRAM(S): at 06:39

## 2017-01-14 RX ADMIN — AMIODARONE HYDROCHLORIDE 400 MILLIGRAM(S): 400 TABLET ORAL at 21:47

## 2017-01-14 RX ADMIN — Medication 40 MILLIEQUIVALENT(S): at 09:33

## 2017-01-14 RX ADMIN — Medication 40 MILLIEQUIVALENT(S): at 07:18

## 2017-01-14 RX ADMIN — FAMOTIDINE 20 MILLIGRAM(S): 10 INJECTION INTRAVENOUS at 18:27

## 2017-01-14 RX ADMIN — Medication 0.5 MILLIGRAM(S): at 18:27

## 2017-01-14 RX ADMIN — AMIODARONE HYDROCHLORIDE 400 MILLIGRAM(S): 400 TABLET ORAL at 14:14

## 2017-01-14 RX ADMIN — HEPARIN SODIUM 5000 UNIT(S): 5000 INJECTION INTRAVENOUS; SUBCUTANEOUS at 06:38

## 2017-01-14 RX ADMIN — Medication 20 MILLIGRAM(S): at 18:27

## 2017-01-14 RX ADMIN — Medication 40 MILLIEQUIVALENT(S): at 21:47

## 2017-01-14 RX ADMIN — Medication 0.5 MILLIGRAM(S): at 07:45

## 2017-01-14 RX ADMIN — Medication 20 MILLIEQUIVALENT(S): at 06:38

## 2017-01-14 RX ADMIN — Medication 650 MILLIGRAM(S): at 00:00

## 2017-01-14 RX ADMIN — Medication 650 MILLIGRAM(S): at 06:42

## 2017-01-14 RX ADMIN — Medication 20 MILLIGRAM(S): at 06:38

## 2017-01-14 RX ADMIN — FAMOTIDINE 20 MILLIGRAM(S): 10 INJECTION INTRAVENOUS at 06:39

## 2017-01-14 RX ADMIN — Medication 81 MILLIGRAM(S): at 11:42

## 2017-01-14 NOTE — PROGRESS NOTE ADULT - SUBJECTIVE AND OBJECTIVE BOX
CTICU  CRITICAL  CARE  attending     Hand off received 					   Pertinent clinical, laboratory, radiographic, hemodynamic, echocardiographic, respiratory data, microbiologic data and chart were reviewed and analyzed frequently throughout the course of the day and night  Patient seen and examined with CTS/ SH attending at bedside  Pt is a 76y , Male, HEALTH ISSUES - PROBLEM Dx:      , FAMILY HISTORY:  PAST MEDICAL & SURGICAL HISTORY:  Prostate cancer  CVA (cerebral vascular accident)  Prostate cancer  Cervical stenosis of spine  Hiatal hernia  Temporal arteritis  Obstructive sleep apnea  Hyperlipidemia  Hypertension  Asthma  No pertinent past medical history  History of tonsillectomy  H/O: knee surgery  H/O prostatectomy  S/P CABG (coronary artery bypass graft)    Patient is a 76y old  Male who presents with a chief complaint of Thoracic Aortic Aneurysm (09 Jan 2017 15:30)  s/p ascending aortic and hemiarch replacement pod 4    14 system review was unremarkable  acute changes include acute respiratory failure  Vital signs, hemodynamic and respiratory parameters were reviewed from the bedside nursing flowsheet.  ICU Vital Signs Last 24 Hrs  T(C): 36.6, Max: 37 (01-14 @ 01:00)  T(F): 97.8, Max: 98.6 (01-14 @ 01:00)  HR: 70 (66 - 88)  BP: 140/63 (115/46 - 140/63)  BP(mean): 85 (75 - 85)  ABP: --  ABP(mean): --  RR: 25 (18 - 28)  SpO2: 98% (94% - 99%)    Adult Advanced Hemodynamics Last 24 Hrs  CVP(mm Hg): --  CVP(cm H2O): --  CO: --  CI: --  PA: --  PA(mean): --  PCWP: --  SVR: --  SVRI: --  PVR: --  PVRI: --, ABG - ( 13 Jan 2017 02:18 )  pH: 7.51  /  pCO2: 29    /  pO2: 74    / HCO3: 23    / Base Excess: x     /  SaO2: 95                  Intake and output was reviewed and the fluid balance was calculated  Daily     Daily   I&O's Summary      All lines and drain sites were assessed  Glycemic trend was reviewedCAPILLARY BLOOD GLUCOSE  132 (12 Jan 2017 22:00)    No acute change in mental status  Auscultation of the chest reveals equal bs  Abdomen is soft  Extremities are warm and well perfused  Wounds appear clean and unremarkable  Antibiotics are periop    labs  CBC Full  -  ( 14 Jan 2017 16:49 )  WBC Count : 6.9 K/uL  Hemoglobin : 8.8 g/dL  Hematocrit : 26.4 %  Platelet Count - Automated : 145 K/uL  Mean Cell Volume : 80.7 fL  Mean Cell Hemoglobin : 26.9 pg  Mean Cell Hemoglobin Concentration : 33.3 g/dL  Auto Neutrophil # : x  Auto Lymphocyte # : x  Auto Monocyte # : x  Auto Eosinophil # : x  Auto Basophil # : x  Auto Neutrophil % : x  Auto Lymphocyte % : x  Auto Monocyte % : x  Auto Eosinophil % : x  Auto Basophil % : x    14 Jan 2017 04:46    139    |  106    |  20     ----------------------------<  106    3.3     |  23     |  0.83     Ca    7.8        14 Jan 2017 04:46  Phos  1.7       13 Jan 2017 02:19  Mg     2.4       14 Jan 2017 04:46      PT/INR - ( 14 Jan 2017 16:49 )   PT: 13.5 sec;   INR: 1.21          PTT - ( 14 Jan 2017 16:49 )  PTT:37.9 sec  The current medications were reviewed   MEDICATIONS  (STANDING):  aspirin enteric coated 81milliGRAM(s) Oral daily  amiodarone    Tablet 400milliGRAM(s) Oral every 8 hours  famotidine    Tablet 20milliGRAM(s) Oral two times a day  buDESOnide   0.5 milliGRAM(s) Respule 0.5milliGRAM(s) Inhalation every 12 hours  FLUoxetine 40milliGRAM(s) Oral daily  sodium chloride 0.9% lock flush 3milliLiter(s) IV Push every 8 hours  metoprolol 12.5milliGRAM(s) Oral every 6 hours  heparin  Injectable 7500Unit(s) SubCutaneous every 8 hours    MEDICATIONS  (PRN):  oxyCODONE  5 mG/acetaminophen 325 mG 1Tablet(s) Oral every 4 hours PRN Moderate Pain (4 - 6)  acetaminophen    Suspension. 650milliGRAM(s) Oral every 6 hours PRN Mild Pain (1 - 3)       PROBLEM LIST/ ASSESSMENT:  HEALTH ISSUES - PROBLEM Dx:      ,   Patient is a 76y old  Male who presents with a chief complaint of Thoracic Aortic Aneurysm (09 Jan 2017 15:30)    s/p ascending aortic and hemiarch replacement pod 4  acute changes include acute respiratory failure    My plan includes :  close hemodynamic, ventilatory and drain monitoring and management per post op routine    Monitor for arrhythmias and monitor parameters for organ perfusion  monitor neurologic status  Head of the bed should remain elevated to 45 deg .   chest PT and IS will be encouraged  monitor adequacy of oxygenation and ventilation and attempt to wean oxygen  Nutritional goals will be met using po eventually , ensure adequate caloric intake and montior the same  Stress ulcer and VTE prophylaxis will be achieved    Glycemic control is satisfactory  Electrolytes have been repleted as necessary and wound care has been carried out. Pain control has been achieved.   agressive physical therapy and early mobility and ambulation goals will be met   The family was updated about the course and plan  CRITICAL CARE TIME SPENT in evaluation and management, reassessments, review and interpretation of labs and x-rays, ventilator and hemodynamic management, formulating a plan and coordinating care: ___90____ MIN.  Time does not include procedural time.  CTICU ATTENDING     					    Levy Ferrari MD

## 2017-01-15 LAB
ANION GAP SERPL CALC-SCNC: 13 MMOL/L — SIGNIFICANT CHANGE UP (ref 9–16)
APTT BLD: 31.9 SEC — SIGNIFICANT CHANGE UP (ref 27.5–37.4)
BUN SERPL-MCNC: 23 MG/DL — SIGNIFICANT CHANGE UP (ref 7–23)
CALCIUM SERPL-MCNC: 7.7 MG/DL — LOW (ref 8.5–10.5)
CHLORIDE SERPL-SCNC: 107 MMOL/L — SIGNIFICANT CHANGE UP (ref 96–108)
CO2 SERPL-SCNC: 20 MMOL/L — LOW (ref 22–31)
CREAT SERPL-MCNC: 0.78 MG/DL — SIGNIFICANT CHANGE UP (ref 0.5–1.3)
GLUCOSE SERPL-MCNC: 109 MG/DL — HIGH (ref 70–99)
HCT VFR BLD CALC: 25.4 % — LOW (ref 39–50)
HGB BLD-MCNC: 8.4 G/DL — LOW (ref 13–17)
INR BLD: 1.24 — HIGH (ref 0.88–1.16)
MAGNESIUM SERPL-MCNC: 2.2 MG/DL — SIGNIFICANT CHANGE UP (ref 1.6–2.4)
MCHC RBC-ENTMCNC: 26.5 PG — LOW (ref 27–34)
MCHC RBC-ENTMCNC: 33.1 G/DL — SIGNIFICANT CHANGE UP (ref 32–36)
MCV RBC AUTO: 80.1 FL — SIGNIFICANT CHANGE UP (ref 80–100)
PHOSPHATE SERPL-MCNC: 3.3 MG/DL — SIGNIFICANT CHANGE UP (ref 2.5–4.5)
PLATELET # BLD AUTO: 155 K/UL — SIGNIFICANT CHANGE UP (ref 150–400)
POTASSIUM SERPL-MCNC: 3 MMOL/L — LOW (ref 3.5–5.3)
POTASSIUM SERPL-SCNC: 3 MMOL/L — LOW (ref 3.5–5.3)
PROTHROM AB SERPL-ACNC: 13.8 SEC — HIGH (ref 10–13.1)
RBC # BLD: 3.17 M/UL — LOW (ref 4.2–5.8)
RBC # FLD: 15 % — SIGNIFICANT CHANGE UP (ref 10.3–16.9)
SODIUM SERPL-SCNC: 140 MMOL/L — SIGNIFICANT CHANGE UP (ref 135–145)
WBC # BLD: 5.9 K/UL — SIGNIFICANT CHANGE UP (ref 3.8–10.5)
WBC # FLD AUTO: 5.9 K/UL — SIGNIFICANT CHANGE UP (ref 3.8–10.5)

## 2017-01-15 PROCEDURE — 99233 SBSQ HOSP IP/OBS HIGH 50: CPT

## 2017-01-15 PROCEDURE — 71010: CPT | Mod: 26

## 2017-01-15 RX ORDER — POTASSIUM CHLORIDE 20 MEQ
40 PACKET (EA) ORAL ONCE
Qty: 0 | Refills: 0 | Status: COMPLETED | OUTPATIENT
Start: 2017-01-15 | End: 2017-01-15

## 2017-01-15 RX ORDER — FUROSEMIDE 40 MG
40 TABLET ORAL ONCE
Qty: 0 | Refills: 0 | Status: COMPLETED | OUTPATIENT
Start: 2017-01-15 | End: 2017-01-15

## 2017-01-15 RX ORDER — CALCIUM CARBONATE 500(1250)
1 TABLET ORAL ONCE
Qty: 0 | Refills: 0 | Status: COMPLETED | OUTPATIENT
Start: 2017-01-15 | End: 2017-01-15

## 2017-01-15 RX ORDER — FUROSEMIDE 40 MG
20 TABLET ORAL ONCE
Qty: 0 | Refills: 0 | Status: DISCONTINUED | OUTPATIENT
Start: 2017-01-15 | End: 2017-01-15

## 2017-01-15 RX ADMIN — HEPARIN SODIUM 7500 UNIT(S): 5000 INJECTION INTRAVENOUS; SUBCUTANEOUS at 13:58

## 2017-01-15 RX ADMIN — Medication 0.5 MILLIGRAM(S): at 06:04

## 2017-01-15 RX ADMIN — Medication 40 MILLIGRAM(S): at 11:58

## 2017-01-15 RX ADMIN — Medication 1 TABLET(S): at 07:40

## 2017-01-15 RX ADMIN — Medication 12.5 MILLIGRAM(S): at 00:27

## 2017-01-15 RX ADMIN — FAMOTIDINE 20 MILLIGRAM(S): 10 INJECTION INTRAVENOUS at 18:42

## 2017-01-15 RX ADMIN — HEPARIN SODIUM 7500 UNIT(S): 5000 INJECTION INTRAVENOUS; SUBCUTANEOUS at 21:48

## 2017-01-15 RX ADMIN — Medication 40 MILLIEQUIVALENT(S): at 10:55

## 2017-01-15 RX ADMIN — SODIUM CHLORIDE 3 MILLILITER(S): 9 INJECTION INTRAMUSCULAR; INTRAVENOUS; SUBCUTANEOUS at 07:09

## 2017-01-15 RX ADMIN — SODIUM CHLORIDE 3 MILLILITER(S): 9 INJECTION INTRAMUSCULAR; INTRAVENOUS; SUBCUTANEOUS at 13:56

## 2017-01-15 RX ADMIN — Medication 650 MILLIGRAM(S): at 21:04

## 2017-01-15 RX ADMIN — Medication 12.5 MILLIGRAM(S): at 11:57

## 2017-01-15 RX ADMIN — Medication 40 MILLIGRAM(S): at 10:55

## 2017-01-15 RX ADMIN — Medication 20 MILLIEQUIVALENT(S): at 07:08

## 2017-01-15 RX ADMIN — HEPARIN SODIUM 7500 UNIT(S): 5000 INJECTION INTRAVENOUS; SUBCUTANEOUS at 07:09

## 2017-01-15 RX ADMIN — Medication 0.5 MILLIGRAM(S): at 18:48

## 2017-01-15 RX ADMIN — FAMOTIDINE 20 MILLIGRAM(S): 10 INJECTION INTRAVENOUS at 07:07

## 2017-01-15 RX ADMIN — Medication 650 MILLIGRAM(S): at 21:24

## 2017-01-15 RX ADMIN — Medication 81 MILLIGRAM(S): at 11:58

## 2017-01-15 RX ADMIN — AMIODARONE HYDROCHLORIDE 400 MILLIGRAM(S): 400 TABLET ORAL at 07:07

## 2017-01-15 RX ADMIN — Medication 12.5 MILLIGRAM(S): at 07:07

## 2017-01-15 RX ADMIN — SODIUM CHLORIDE 3 MILLILITER(S): 9 INJECTION INTRAMUSCULAR; INTRAVENOUS; SUBCUTANEOUS at 21:04

## 2017-01-15 RX ADMIN — Medication 40 MILLIEQUIVALENT(S): at 07:09

## 2017-01-15 NOTE — PROGRESS NOTE ADULT - ASSESSMENT
75yo with h/o CAD s/p CABG X4 2005, ascending aortic aneurysm s/p ascending and hemiarch replacement.  POD #4 doing well    Problems  1. s/p Ascending and hemiarch aorta replacment  2. post op PAF  3. Hemodynamic instability  4. h/o PAUL  5.  h/o lumbar stenosis  6. recent history of ischemic CVA without deficit    Plan  Neuro - history of recent stroke  CVS - stable.  pt remains sinus   restart ASA, statin and low dose BB  Pulm - continue to monitor resp status pt extubated  @ 6 am  h/o Reactive airway disease but currently not on treatment  CPAP at night for h/o PAUL  GI - diet, pt with diarrhea.  Stool studies sent.     - Cr stable, diuresis if BP allows  Heme - DVT proph  Endo - glycemic control    Critical care time spent 45 min 77yo with h/o CAD s/p CABG X4 2005, ascending aortic aneurysm s/p ascending and hemiarch replacement.  POD #4 doing well    Problems  1. s/p Ascending and hemiarch aorta replacment  2. post op PAF  3. Hemodynamic instability  4. h/o PAUL  5.  h/o lumbar stenosis  6. recent history of ischemic CVA without deficit    Plan  Neuro - history of recent stroke  CVS - stable.  pt remains sinus    ASA, statin and low dose BB  Pulm - continue to monitor resp status   h/o Reactive airway disease but currently not on treatment  CPAP at night for h/o PAUL  GI - diet, pt with diarrhea.  Stool studies sent.     - Cr stable, diuresis if BP allows  Heme - DVT proph  Endo - glycemic control    Critical care time spent 45 min

## 2017-01-15 NOTE — PROGRESS NOTE ADULT - SUBJECTIVE AND OBJECTIVE BOX
POD #4 s/p Ascending aorta/hemiarch, re-implanted coronaries     Extubated this AM  Pressors weaned  Left fem A line removed  Rate controlled AFib this AM s/p Amio 150mg bolus    PMH :  CVA (cerebral vascular accident)  Prostate cancer  Cervical stenosis of spine  Hiatal hernia  Temporal arteritis  Obstructive sleep apnea  Hyperlipidemia  Hypertension  Asthma  Ascending aortic aneurysm  History of tonsillectomy    ROS  All other systems reviewed and negative.    ICU Vital Signs Last 24 Hrs  T(C): 36.7, Max: 36.7 (01-15 @ 09:20)  T(F): 98, Max: 98.1 (01-15 @ 09:20)  HR: 72 (60 - 666)  BP: 110/55 (95/44 - 145/65)  BP(mean): 73 (62 - 92)  ABP: --  ABP(mean): --  RR: 24 (17 - 31)  SpO2: 96% (96% - 100%)    I&O's Summary  I & Os for 24h ending 15 Sanket 2017 07:00  =============================================  IN: 1000 ml / OUT: 55 ml / NET: 945 ml    I & Os for current day (as of 15 Sanket 2017 15:48)  =============================================  IN: 1000 ml / OUT: 1555 ml / NET: -555 ml                        8.4    5.9   )-----------( 155      ( 15 Sanket 2017 04:24 )             25.4     15 Sanket 2017 04:24    140    |  107    |  23     ----------------------------<  109    3.0     |  20     |  0.78     Ca    7.7        15 Sanket 2017 04:24  Phos  3.3       15 Sanket 2017 04:24  Mg     2.2       15 Sanket 2017 04:24    TPro  6.1    /  Alb  2.7    /  TBili  1.3    /  DBili  0.21   /  AST  13     /  ALT  13     /  AlkPhos  56     14 Jan 2017 16:49    PT/INR - ( 15 Sanket 2017 04:24 )   PT: 13.8 sec;   INR: 1.24     PTT - ( 15 Sanket 2017 04:24 )  PTT:31.9 sec    MEDICATIONS  (STANDING):  aspirin enteric coated 81milliGRAM(s) Oral daily  amiodarone    Tablet 200milliGRAM(s) Oral daily  famotidine    Tablet 20milliGRAM(s) Oral two times a day  buDESOnide   0.5 milliGRAM(s) Respule 0.5milliGRAM(s) Inhalation every 12 hours  FLUoxetine 40milliGRAM(s) Oral daily  metoprolol 12.5milliGRAM(s) Oral every 6 hours  heparin  Injectable 7500Unit(s) SubCutaneous every 8 hours    PHYSICAL EXAM:  Gen : no acute distress  Neck: No LAD, No JVD  Respiratory: decreased in the bases  Cardiovascular: S1 and S2, RRR, no M/G/R  Gastrointestinal: BS+, soft, NT/ND  Extremities: No peripheral edema  Vascular: 2+ peripheral pulses  Neurological: A/O x 3, no focal deficits POD #4 s/p Ascending aorta/hemiarch, re-implanted coronaries     PMH :  CVA (cerebral vascular accident)  Prostate cancer  Cervical stenosis of spine  Hiatal hernia  Temporal arteritis  Obstructive sleep apnea  Hyperlipidemia  Hypertension  Asthma  Ascending aortic aneurysm  History of tonsillectomy    ROS  All other systems reviewed and negative.    ICU Vital Signs Last 24 Hrs  T(C): 36.7, Max: 36.7 (01-15 @ 09:20)  T(F): 98, Max: 98.1 (01-15 @ 09:20)  HR: 72 (60 - 666)  BP: 110/55 (95/44 - 145/65)  BP(mean): 73 (62 - 92)  ABP: --  ABP(mean): --  RR: 24 (17 - 31)  SpO2: 96% (96% - 100%)    I&O's Summary  I & Os for 24h ending 15 Sanket 2017 07:00  =============================================  IN: 1000 ml / OUT: 55 ml / NET: 945 ml    I & Os for current day (as of 15 Sanket 2017 15:48)  =============================================  IN: 1000 ml / OUT: 1555 ml / NET: -555 ml                        8.4    5.9   )-----------( 155      ( 15 Sanket 2017 04:24 )             25.4     15 Sanket 2017 04:24    140    |  107    |  23     ----------------------------<  109    3.0     |  20     |  0.78     Ca    7.7        15 Sanket 2017 04:24  Phos  3.3       15 Sanket 2017 04:24  Mg     2.2       15 Sanket 2017 04:24    TPro  6.1    /  Alb  2.7    /  TBili  1.3    /  DBili  0.21   /  AST  13     /  ALT  13     /  AlkPhos  56     14 Jan 2017 16:49    PT/INR - ( 15 Sanket 2017 04:24 )   PT: 13.8 sec;   INR: 1.24     PTT - ( 15 Sanket 2017 04:24 )  PTT:31.9 sec    MEDICATIONS  (STANDING):  aspirin enteric coated 81milliGRAM(s) Oral daily  amiodarone    Tablet 200milliGRAM(s) Oral daily  famotidine    Tablet 20milliGRAM(s) Oral two times a day  buDESOnide   0.5 milliGRAM(s) Respule 0.5milliGRAM(s) Inhalation every 12 hours  FLUoxetine 40milliGRAM(s) Oral daily  metoprolol 12.5milliGRAM(s) Oral every 6 hours  heparin  Injectable 7500Unit(s) SubCutaneous every 8 hours    PHYSICAL EXAM:  Gen : no acute distress  Neck: No LAD, No JVD  Respiratory: decreased in the bases  Cardiovascular: S1 and S2, RRR, no M/G/R  Gastrointestinal: BS+, soft, NT/ND  Extremities: No peripheral edema  Vascular: 2+ peripheral pulses  Neurological: A/O x 3, no focal deficits

## 2017-01-16 LAB
ANION GAP SERPL CALC-SCNC: 13 MMOL/L — SIGNIFICANT CHANGE UP (ref 9–16)
BUN SERPL-MCNC: 21 MG/DL — SIGNIFICANT CHANGE UP (ref 7–23)
CA-I BLD-SCNC: 1.17 MMOL/L — SIGNIFICANT CHANGE UP (ref 1.05–1.34)
CALCIUM SERPL-MCNC: 8.4 MG/DL — LOW (ref 8.5–10.5)
CHLORIDE SERPL-SCNC: 107 MMOL/L — SIGNIFICANT CHANGE UP (ref 96–108)
CO2 SERPL-SCNC: 18 MMOL/L — LOW (ref 22–31)
CREAT SERPL-MCNC: 0.8 MG/DL — SIGNIFICANT CHANGE UP (ref 0.5–1.3)
GLUCOSE SERPL-MCNC: 102 MG/DL — HIGH (ref 70–99)
HCT VFR BLD CALC: 28.5 % — LOW (ref 39–50)
HGB BLD-MCNC: 9.6 G/DL — LOW (ref 13–17)
MAGNESIUM SERPL-MCNC: 2 MG/DL — SIGNIFICANT CHANGE UP (ref 1.6–2.4)
MCHC RBC-ENTMCNC: 26.6 PG — LOW (ref 27–34)
MCHC RBC-ENTMCNC: 33.7 G/DL — SIGNIFICANT CHANGE UP (ref 32–36)
MCV RBC AUTO: 78.9 FL — LOW (ref 80–100)
PHOSPHATE SERPL-MCNC: 3.6 MG/DL — SIGNIFICANT CHANGE UP (ref 2.5–4.5)
PLATELET # BLD AUTO: 186 K/UL — SIGNIFICANT CHANGE UP (ref 150–400)
POTASSIUM SERPL-MCNC: 3.2 MMOL/L — LOW (ref 3.5–5.3)
POTASSIUM SERPL-SCNC: 3.2 MMOL/L — LOW (ref 3.5–5.3)
RBC # BLD: 3.61 M/UL — LOW (ref 4.2–5.8)
RBC # FLD: 15 % — SIGNIFICANT CHANGE UP (ref 10.3–16.9)
SODIUM SERPL-SCNC: 138 MMOL/L — SIGNIFICANT CHANGE UP (ref 135–145)
WBC # BLD: 5.8 K/UL — SIGNIFICANT CHANGE UP (ref 3.8–10.5)
WBC # FLD AUTO: 5.8 K/UL — SIGNIFICANT CHANGE UP (ref 3.8–10.5)

## 2017-01-16 PROCEDURE — 74000: CPT | Mod: 26

## 2017-01-16 PROCEDURE — 71010: CPT | Mod: 26

## 2017-01-16 RX ORDER — POTASSIUM CHLORIDE 20 MEQ
10 PACKET (EA) ORAL ONCE
Qty: 0 | Refills: 0 | Status: COMPLETED | OUTPATIENT
Start: 2017-01-16 | End: 2017-01-16

## 2017-01-16 RX ORDER — ACETAMINOPHEN 500 MG
650 TABLET ORAL EVERY 6 HOURS
Qty: 0 | Refills: 0 | Status: DISCONTINUED | OUTPATIENT
Start: 2017-01-16 | End: 2017-01-17

## 2017-01-16 RX ORDER — ATORVASTATIN CALCIUM 80 MG/1
40 TABLET, FILM COATED ORAL AT BEDTIME
Qty: 0 | Refills: 0 | Status: DISCONTINUED | OUTPATIENT
Start: 2017-01-16 | End: 2017-01-19

## 2017-01-16 RX ORDER — FUROSEMIDE 40 MG
40 TABLET ORAL
Qty: 0 | Refills: 0 | Status: DISCONTINUED | OUTPATIENT
Start: 2017-01-16 | End: 2017-01-17

## 2017-01-16 RX ORDER — POTASSIUM CHLORIDE 20 MEQ
20 PACKET (EA) ORAL
Qty: 0 | Refills: 0 | Status: DISCONTINUED | OUTPATIENT
Start: 2017-01-16 | End: 2017-01-17

## 2017-01-16 RX ORDER — SODIUM CHLORIDE 9 MG/ML
3 INJECTION INTRAMUSCULAR; INTRAVENOUS; SUBCUTANEOUS EVERY 8 HOURS
Qty: 0 | Refills: 0 | Status: DISCONTINUED | OUTPATIENT
Start: 2017-01-16 | End: 2017-01-22

## 2017-01-16 RX ORDER — LOPERAMIDE HCL 2 MG
2 TABLET ORAL THREE TIMES A DAY
Qty: 0 | Refills: 0 | Status: DISCONTINUED | OUTPATIENT
Start: 2017-01-16 | End: 2017-01-16

## 2017-01-16 RX ORDER — POTASSIUM CHLORIDE 20 MEQ
40 PACKET (EA) ORAL ONCE
Qty: 0 | Refills: 0 | Status: COMPLETED | OUTPATIENT
Start: 2017-01-16 | End: 2017-01-16

## 2017-01-16 RX ADMIN — SODIUM CHLORIDE 3 MILLILITER(S): 9 INJECTION INTRAMUSCULAR; INTRAVENOUS; SUBCUTANEOUS at 14:54

## 2017-01-16 RX ADMIN — HEPARIN SODIUM 7500 UNIT(S): 5000 INJECTION INTRAVENOUS; SUBCUTANEOUS at 22:37

## 2017-01-16 RX ADMIN — Medication 650 MILLIGRAM(S): at 09:24

## 2017-01-16 RX ADMIN — Medication 12.5 MILLIGRAM(S): at 19:28

## 2017-01-16 RX ADMIN — FAMOTIDINE 20 MILLIGRAM(S): 10 INJECTION INTRAVENOUS at 07:02

## 2017-01-16 RX ADMIN — FAMOTIDINE 20 MILLIGRAM(S): 10 INJECTION INTRAVENOUS at 19:28

## 2017-01-16 RX ADMIN — Medication 650 MILLIGRAM(S): at 10:15

## 2017-01-16 RX ADMIN — Medication 20 MILLIEQUIVALENT(S): at 19:28

## 2017-01-16 RX ADMIN — Medication 40 MILLIGRAM(S): at 10:40

## 2017-01-16 RX ADMIN — Medication 40 MILLIGRAM(S): at 12:10

## 2017-01-16 RX ADMIN — SODIUM CHLORIDE 3 MILLILITER(S): 9 INJECTION INTRAMUSCULAR; INTRAVENOUS; SUBCUTANEOUS at 22:23

## 2017-01-16 RX ADMIN — Medication 2 MILLIGRAM(S): at 12:09

## 2017-01-16 RX ADMIN — SODIUM CHLORIDE 3 MILLILITER(S): 9 INJECTION INTRAMUSCULAR; INTRAVENOUS; SUBCUTANEOUS at 07:02

## 2017-01-16 RX ADMIN — Medication 40 MILLIGRAM(S): at 19:28

## 2017-01-16 RX ADMIN — Medication 12.5 MILLIGRAM(S): at 07:02

## 2017-01-16 RX ADMIN — Medication 12.5 MILLIGRAM(S): at 12:10

## 2017-01-16 RX ADMIN — Medication 0.5 MILLIGRAM(S): at 06:27

## 2017-01-16 RX ADMIN — HEPARIN SODIUM 7500 UNIT(S): 5000 INJECTION INTRAVENOUS; SUBCUTANEOUS at 14:56

## 2017-01-16 RX ADMIN — Medication 40 MILLIEQUIVALENT(S): at 08:00

## 2017-01-16 RX ADMIN — HEPARIN SODIUM 7500 UNIT(S): 5000 INJECTION INTRAVENOUS; SUBCUTANEOUS at 06:30

## 2017-01-16 RX ADMIN — Medication 81 MILLIGRAM(S): at 12:10

## 2017-01-16 NOTE — PROGRESS NOTE ADULT - ASSESSMENT
77yo with h/o CAD s/p CABG X4 2005, ascending aortic aneurysm s/p ascending and hemiarch replacement.  POD #5 doing well    Problems  1. s/p Ascending and hemiarch aorta replacment  2. post op PAF  3. Hemodynamic instability  4. h/o PAUL  5.  h/o lumbar stenosis  6. recent history of ischemic CVA without deficit    Plan  Neuro - history of recent stroke, non-focal   CVS - stable.  pt remains sinus   restart ASA, statin and tolerating BB   Pulm - pulm toleting, IS, ambulation  CPAP at night for h/o PAUL  GI - diet, pt with diarrhea.  Stool studies sent.     - Cr stable, diuresis if BP allows  Heme - DVT proph  Endo - glycemic control    Critical care time spent 45 min

## 2017-01-17 LAB
ALBUMIN SERPL ELPH-MCNC: 2.7 G/DL — LOW (ref 3.4–5)
ALP SERPL-CCNC: 58 U/L — SIGNIFICANT CHANGE UP (ref 40–120)
ALP SERPL-CCNC: 59 U/L — SIGNIFICANT CHANGE UP (ref 40–120)
ALP SERPL-CCNC: 61 U/L — SIGNIFICANT CHANGE UP (ref 40–120)
ALT FLD-CCNC: 27 U/L — SIGNIFICANT CHANGE UP (ref 12–42)
ALT FLD-CCNC: 28 U/L — SIGNIFICANT CHANGE UP (ref 12–42)
ALT FLD-CCNC: 31 U/L — SIGNIFICANT CHANGE UP (ref 12–42)
AMYLASE P1 CFR SERPL: 29 U/L — SIGNIFICANT CHANGE UP (ref 25–115)
ANION GAP SERPL CALC-SCNC: 11 MMOL/L — SIGNIFICANT CHANGE UP (ref 9–16)
ANION GAP SERPL CALC-SCNC: 11 MMOL/L — SIGNIFICANT CHANGE UP (ref 9–16)
ANION GAP SERPL CALC-SCNC: 8 MMOL/L — LOW (ref 9–16)
ANION GAP SERPL CALC-SCNC: 9 MMOL/L — SIGNIFICANT CHANGE UP (ref 9–16)
APTT BLD: 33.7 SEC — SIGNIFICANT CHANGE UP (ref 27.5–37.4)
APTT BLD: 34.6 SEC — SIGNIFICANT CHANGE UP (ref 27.5–37.4)
AST SERPL-CCNC: 18 U/L — SIGNIFICANT CHANGE UP (ref 15–37)
AST SERPL-CCNC: 22 U/L — SIGNIFICANT CHANGE UP (ref 15–37)
AST SERPL-CCNC: 23 U/L — SIGNIFICANT CHANGE UP (ref 15–37)
BILIRUB DIRECT SERPL-MCNC: 0.18 MG/DL — SIGNIFICANT CHANGE UP
BILIRUB INDIRECT FLD-MCNC: 0.6 MG/DL — SIGNIFICANT CHANGE UP (ref 0.2–1)
BILIRUB SERPL-MCNC: 0.7 MG/DL — SIGNIFICANT CHANGE UP (ref 0.2–1.2)
BILIRUB SERPL-MCNC: 0.8 MG/DL — SIGNIFICANT CHANGE UP (ref 0.2–1.2)
BILIRUB SERPL-MCNC: 1.2 MG/DL — SIGNIFICANT CHANGE UP (ref 0.2–1.2)
BUN SERPL-MCNC: 20 MG/DL — SIGNIFICANT CHANGE UP (ref 7–23)
BUN SERPL-MCNC: 21 MG/DL — SIGNIFICANT CHANGE UP (ref 7–23)
CALCIUM SERPL-MCNC: 8.2 MG/DL — LOW (ref 8.5–10.5)
CALCIUM SERPL-MCNC: 8.3 MG/DL — LOW (ref 8.5–10.5)
CALCIUM SERPL-MCNC: 8.4 MG/DL — LOW (ref 8.5–10.5)
CALCIUM SERPL-MCNC: 8.4 MG/DL — LOW (ref 8.5–10.5)
CHLORIDE SERPL-SCNC: 104 MMOL/L — SIGNIFICANT CHANGE UP (ref 96–108)
CHLORIDE SERPL-SCNC: 104 MMOL/L — SIGNIFICANT CHANGE UP (ref 96–108)
CHLORIDE SERPL-SCNC: 107 MMOL/L — SIGNIFICANT CHANGE UP (ref 96–108)
CHLORIDE SERPL-SCNC: 108 MMOL/L — SIGNIFICANT CHANGE UP (ref 96–108)
CO2 SERPL-SCNC: 21 MMOL/L — LOW (ref 22–31)
CO2 SERPL-SCNC: 24 MMOL/L — SIGNIFICANT CHANGE UP (ref 22–31)
CO2 SERPL-SCNC: 25 MMOL/L — SIGNIFICANT CHANGE UP (ref 22–31)
CO2 SERPL-SCNC: 25 MMOL/L — SIGNIFICANT CHANGE UP (ref 22–31)
CREAT SERPL-MCNC: 0.86 MG/DL — SIGNIFICANT CHANGE UP (ref 0.5–1.3)
CREAT SERPL-MCNC: 0.92 MG/DL — SIGNIFICANT CHANGE UP (ref 0.5–1.3)
CREAT SERPL-MCNC: 0.98 MG/DL — SIGNIFICANT CHANGE UP (ref 0.5–1.3)
CREAT SERPL-MCNC: 1 MG/DL — SIGNIFICANT CHANGE UP (ref 0.5–1.3)
GAS PNL BLDA: SIGNIFICANT CHANGE UP
GLUCOSE SERPL-MCNC: 107 MG/DL — HIGH (ref 70–99)
GLUCOSE SERPL-MCNC: 109 MG/DL — HIGH (ref 70–99)
GLUCOSE SERPL-MCNC: 112 MG/DL — HIGH (ref 70–99)
GLUCOSE SERPL-MCNC: 112 MG/DL — HIGH (ref 70–99)
HCT VFR BLD CALC: 26.5 % — LOW (ref 39–50)
HCT VFR BLD CALC: 27.9 % — LOW (ref 39–50)
HGB BLD-MCNC: 8.9 G/DL — LOW (ref 13–17)
HGB BLD-MCNC: 9.3 G/DL — LOW (ref 13–17)
INR BLD: 1.24 — HIGH (ref 0.88–1.16)
INR BLD: 1.24 — HIGH (ref 0.88–1.16)
LACTATE SERPL-SCNC: 0.7 MMOL/L — SIGNIFICANT CHANGE UP (ref 0.5–2)
LACTATE SERPL-SCNC: 1.3 MMOL/L — SIGNIFICANT CHANGE UP (ref 0.5–2)
LIDOCAIN IGE QN: 127 U/L — SIGNIFICANT CHANGE UP (ref 73–393)
MAGNESIUM SERPL-MCNC: 1.7 MG/DL — SIGNIFICANT CHANGE UP (ref 1.6–2.4)
MAGNESIUM SERPL-MCNC: 2 MG/DL — SIGNIFICANT CHANGE UP (ref 1.6–2.4)
MCHC RBC-ENTMCNC: 26.6 PG — LOW (ref 27–34)
MCHC RBC-ENTMCNC: 26.6 PG — LOW (ref 27–34)
MCHC RBC-ENTMCNC: 33.3 G/DL — SIGNIFICANT CHANGE UP (ref 32–36)
MCHC RBC-ENTMCNC: 33.6 G/DL — SIGNIFICANT CHANGE UP (ref 32–36)
MCV RBC AUTO: 79.1 FL — LOW (ref 80–100)
MCV RBC AUTO: 79.7 FL — LOW (ref 80–100)
PHOSPHATE SERPL-MCNC: 3.4 MG/DL — SIGNIFICANT CHANGE UP (ref 2.5–4.5)
PHOSPHATE SERPL-MCNC: 4.4 MG/DL — SIGNIFICANT CHANGE UP (ref 2.5–4.5)
PLATELET # BLD AUTO: 212 K/UL — SIGNIFICANT CHANGE UP (ref 150–400)
PLATELET # BLD AUTO: 267 K/UL — SIGNIFICANT CHANGE UP (ref 150–400)
POTASSIUM SERPL-MCNC: 2.6 MMOL/L — CRITICAL LOW (ref 3.5–5.3)
POTASSIUM SERPL-MCNC: 2.8 MMOL/L — CRITICAL LOW (ref 3.5–5.3)
POTASSIUM SERPL-MCNC: 3.2 MMOL/L — LOW (ref 3.5–5.3)
POTASSIUM SERPL-MCNC: 3.9 MMOL/L — SIGNIFICANT CHANGE UP (ref 3.5–5.3)
POTASSIUM SERPL-SCNC: 2.6 MMOL/L — CRITICAL LOW (ref 3.5–5.3)
POTASSIUM SERPL-SCNC: 2.8 MMOL/L — CRITICAL LOW (ref 3.5–5.3)
POTASSIUM SERPL-SCNC: 3.2 MMOL/L — LOW (ref 3.5–5.3)
POTASSIUM SERPL-SCNC: 3.9 MMOL/L — SIGNIFICANT CHANGE UP (ref 3.5–5.3)
PROT SERPL-MCNC: 5.9 G/DL — LOW (ref 6.4–8.2)
PROT SERPL-MCNC: 5.9 G/DL — LOW (ref 6.4–8.2)
PROT SERPL-MCNC: 6.1 G/DL — LOW (ref 6.4–8.2)
PROTHROM AB SERPL-ACNC: 13.8 SEC — HIGH (ref 10–13.1)
PROTHROM AB SERPL-ACNC: 13.8 SEC — HIGH (ref 10–13.1)
RBC # BLD: 3.35 M/UL — LOW (ref 4.2–5.8)
RBC # BLD: 3.5 M/UL — LOW (ref 4.2–5.8)
RBC # FLD: 14.5 % — SIGNIFICANT CHANGE UP (ref 10.3–16.9)
RBC # FLD: 14.8 % — SIGNIFICANT CHANGE UP (ref 10.3–16.9)
SODIUM SERPL-SCNC: 138 MMOL/L — SIGNIFICANT CHANGE UP (ref 135–145)
SODIUM SERPL-SCNC: 139 MMOL/L — SIGNIFICANT CHANGE UP (ref 135–145)
SODIUM SERPL-SCNC: 140 MMOL/L — SIGNIFICANT CHANGE UP (ref 135–145)
SODIUM SERPL-SCNC: 140 MMOL/L — SIGNIFICANT CHANGE UP (ref 135–145)
WBC # BLD: 6.3 K/UL — SIGNIFICANT CHANGE UP (ref 3.8–10.5)
WBC # BLD: 6.3 K/UL — SIGNIFICANT CHANGE UP (ref 3.8–10.5)
WBC # FLD AUTO: 6.3 K/UL — SIGNIFICANT CHANGE UP (ref 3.8–10.5)
WBC # FLD AUTO: 6.3 K/UL — SIGNIFICANT CHANGE UP (ref 3.8–10.5)
WRIGHT STN STL: SIGNIFICANT CHANGE UP

## 2017-01-17 PROCEDURE — 93010 ELECTROCARDIOGRAM REPORT: CPT

## 2017-01-17 PROCEDURE — 99222 1ST HOSP IP/OBS MODERATE 55: CPT

## 2017-01-17 PROCEDURE — 36620 INSERTION CATHETER ARTERY: CPT

## 2017-01-17 PROCEDURE — 71010: CPT | Mod: 26,59

## 2017-01-17 PROCEDURE — 99292 CRITICAL CARE ADDL 30 MIN: CPT | Mod: 25

## 2017-01-17 PROCEDURE — 36556 INSERT NON-TUNNEL CV CATH: CPT

## 2017-01-17 PROCEDURE — 99291 CRITICAL CARE FIRST HOUR: CPT | Mod: 25

## 2017-01-17 PROCEDURE — 76937 US GUIDE VASCULAR ACCESS: CPT | Mod: 26

## 2017-01-17 PROCEDURE — 74177 CT ABD & PELVIS W/CONTRAST: CPT | Mod: 26

## 2017-01-17 PROCEDURE — 74000: CPT | Mod: 26,59

## 2017-01-17 PROCEDURE — 74022 RADEX COMPL AQT ABD SERIES: CPT | Mod: 26

## 2017-01-17 RX ORDER — FUROSEMIDE 40 MG
40 TABLET ORAL
Qty: 0 | Refills: 0 | Status: COMPLETED | OUTPATIENT
Start: 2017-01-17 | End: 2017-01-18

## 2017-01-17 RX ORDER — SODIUM CHLORIDE 9 MG/ML
1000 INJECTION, SOLUTION INTRAVENOUS ONCE
Qty: 0 | Refills: 0 | Status: COMPLETED | OUTPATIENT
Start: 2017-01-17 | End: 2017-01-17

## 2017-01-17 RX ORDER — POTASSIUM CHLORIDE 20 MEQ
40 PACKET (EA) ORAL ONCE
Qty: 0 | Refills: 0 | Status: COMPLETED | OUTPATIENT
Start: 2017-01-17 | End: 2017-01-17

## 2017-01-17 RX ORDER — POTASSIUM CHLORIDE 20 MEQ
20 PACKET (EA) ORAL ONCE
Qty: 0 | Refills: 0 | Status: DISCONTINUED | OUTPATIENT
Start: 2017-01-17 | End: 2017-01-17

## 2017-01-17 RX ORDER — NEOSTIGMINE METHYLSULFATE 1 MG/ML
2 VIAL (ML) INJECTION ONCE
Qty: 0 | Refills: 0 | Status: COMPLETED | OUTPATIENT
Start: 2017-01-17 | End: 2017-01-17

## 2017-01-17 RX ORDER — SODIUM CHLORIDE 9 MG/ML
1000 INJECTION, SOLUTION INTRAVENOUS ONCE
Qty: 0 | Refills: 0 | Status: COMPLETED | OUTPATIENT
Start: 2017-01-17 | End: 2017-01-18

## 2017-01-17 RX ORDER — POTASSIUM CHLORIDE 20 MEQ
20 PACKET (EA) ORAL
Qty: 0 | Refills: 0 | Status: DISCONTINUED | OUTPATIENT
Start: 2017-01-17 | End: 2017-01-17

## 2017-01-17 RX ORDER — SODIUM CHLORIDE 9 MG/ML
1000 INJECTION INTRAMUSCULAR; INTRAVENOUS; SUBCUTANEOUS ONCE
Qty: 0 | Refills: 0 | Status: COMPLETED | OUTPATIENT
Start: 2017-01-17 | End: 2017-01-17

## 2017-01-17 RX ORDER — FAMOTIDINE 10 MG/ML
20 INJECTION INTRAVENOUS EVERY 12 HOURS
Qty: 0 | Refills: 0 | Status: COMPLETED | OUTPATIENT
Start: 2017-01-17 | End: 2017-01-18

## 2017-01-17 RX ORDER — POTASSIUM CHLORIDE 20 MEQ
20 PACKET (EA) ORAL
Qty: 0 | Refills: 0 | Status: COMPLETED | OUTPATIENT
Start: 2017-01-17 | End: 2017-01-18

## 2017-01-17 RX ORDER — METOCLOPRAMIDE HCL 10 MG
10 TABLET ORAL ONCE
Qty: 0 | Refills: 0 | Status: COMPLETED | OUTPATIENT
Start: 2017-01-17 | End: 2017-01-17

## 2017-01-17 RX ORDER — SODIUM CHLORIDE 9 MG/ML
1000 INJECTION, SOLUTION INTRAVENOUS
Qty: 0 | Refills: 0 | Status: DISCONTINUED | OUTPATIENT
Start: 2017-01-17 | End: 2017-01-21

## 2017-01-17 RX ORDER — METOPROLOL TARTRATE 50 MG
2.5 TABLET ORAL EVERY 6 HOURS
Qty: 0 | Refills: 0 | Status: DISCONTINUED | OUTPATIENT
Start: 2017-01-17 | End: 2017-01-20

## 2017-01-17 RX ORDER — POTASSIUM CHLORIDE 20 MEQ
40 PACKET (EA) ORAL
Qty: 0 | Refills: 0 | Status: DISCONTINUED | OUTPATIENT
Start: 2017-01-17 | End: 2017-01-17

## 2017-01-17 RX ORDER — POTASSIUM CHLORIDE 20 MEQ
20 PACKET (EA) ORAL
Qty: 0 | Refills: 0 | Status: COMPLETED | OUTPATIENT
Start: 2017-01-17 | End: 2017-01-17

## 2017-01-17 RX ORDER — MAGNESIUM OXIDE 400 MG ORAL TABLET 241.3 MG
800 TABLET ORAL ONCE
Qty: 0 | Refills: 0 | Status: COMPLETED | OUTPATIENT
Start: 2017-01-17 | End: 2017-01-17

## 2017-01-17 RX ORDER — DIATRIZOATE MEGLUMINE 180 MG/ML
30 INJECTION, SOLUTION INTRAVESICAL ONCE
Qty: 0 | Refills: 0 | Status: COMPLETED | OUTPATIENT
Start: 2017-01-17 | End: 2017-01-17

## 2017-01-17 RX ADMIN — ATORVASTATIN CALCIUM 40 MILLIGRAM(S): 80 TABLET, FILM COATED ORAL at 00:18

## 2017-01-17 RX ADMIN — Medication 50 MILLIEQUIVALENT(S): at 10:47

## 2017-01-17 RX ADMIN — SODIUM CHLORIDE 3 MILLILITER(S): 9 INJECTION INTRAMUSCULAR; INTRAVENOUS; SUBCUTANEOUS at 13:46

## 2017-01-17 RX ADMIN — Medication 0.5 MILLIGRAM(S): at 18:00

## 2017-01-17 RX ADMIN — Medication 81 MILLIGRAM(S): at 12:52

## 2017-01-17 RX ADMIN — Medication 10 MILLIGRAM(S): at 06:26

## 2017-01-17 RX ADMIN — SODIUM CHLORIDE 3 MILLILITER(S): 9 INJECTION INTRAMUSCULAR; INTRAVENOUS; SUBCUTANEOUS at 21:15

## 2017-01-17 RX ADMIN — SODIUM CHLORIDE 100 MILLILITER(S): 9 INJECTION INTRAMUSCULAR; INTRAVENOUS; SUBCUTANEOUS at 07:02

## 2017-01-17 RX ADMIN — Medication 0.5 MILLIGRAM(S): at 06:23

## 2017-01-17 RX ADMIN — Medication 50 MILLIEQUIVALENT(S): at 06:59

## 2017-01-17 RX ADMIN — Medication 40 MILLIEQUIVALENT(S): at 04:36

## 2017-01-17 RX ADMIN — SODIUM CHLORIDE 75 MILLILITER(S): 9 INJECTION, SOLUTION INTRAVENOUS at 19:46

## 2017-01-17 RX ADMIN — HEPARIN SODIUM 7500 UNIT(S): 5000 INJECTION INTRAVENOUS; SUBCUTANEOUS at 22:40

## 2017-01-17 RX ADMIN — Medication 12.5 MILLIGRAM(S): at 12:52

## 2017-01-17 RX ADMIN — SODIUM CHLORIDE 500 MILLILITER(S): 9 INJECTION, SOLUTION INTRAVENOUS at 22:41

## 2017-01-17 RX ADMIN — Medication 100 MILLIEQUIVALENT(S): at 23:00

## 2017-01-17 RX ADMIN — Medication 50 MILLIEQUIVALENT(S): at 04:59

## 2017-01-17 RX ADMIN — MAGNESIUM OXIDE 400 MG ORAL TABLET 800 MILLIGRAM(S): 241.3 TABLET ORAL at 04:36

## 2017-01-17 RX ADMIN — Medication 40 MILLIEQUIVALENT(S): at 06:28

## 2017-01-17 RX ADMIN — DIATRIZOATE MEGLUMINE 30 MILLILITER(S): 180 INJECTION, SOLUTION INTRAVESICAL at 10:27

## 2017-01-17 RX ADMIN — HEPARIN SODIUM 7500 UNIT(S): 5000 INJECTION INTRAVENOUS; SUBCUTANEOUS at 13:49

## 2017-01-17 RX ADMIN — Medication 12.5 MILLIGRAM(S): at 00:13

## 2017-01-17 RX ADMIN — Medication 40 MILLIGRAM(S): at 13:06

## 2017-01-17 RX ADMIN — FAMOTIDINE 20 MILLIGRAM(S): 10 INJECTION INTRAVENOUS at 06:27

## 2017-01-17 RX ADMIN — Medication 40 MILLIEQUIVALENT(S): at 03:15

## 2017-01-17 RX ADMIN — SODIUM CHLORIDE 3 MILLILITER(S): 9 INJECTION INTRAMUSCULAR; INTRAVENOUS; SUBCUTANEOUS at 06:07

## 2017-01-17 RX ADMIN — Medication 40 MILLIEQUIVALENT(S): at 10:46

## 2017-01-17 RX ADMIN — Medication 12.5 MILLIGRAM(S): at 06:27

## 2017-01-17 RX ADMIN — SODIUM CHLORIDE 500 MILLILITER(S): 9 INJECTION, SOLUTION INTRAVENOUS at 21:28

## 2017-01-17 RX ADMIN — HEPARIN SODIUM 7500 UNIT(S): 5000 INJECTION INTRAVENOUS; SUBCUTANEOUS at 06:26

## 2017-01-17 RX ADMIN — Medication 2 MILLIGRAM(S): at 21:11

## 2017-01-17 NOTE — CONSULT NOTE ADULT - SUBJECTIVE AND OBJECTIVE BOX
I have seen and examined the patient in the CTS ICU with the intensivist. His abdomen is distended but not tender. There is no peritoneal signs. His WBC is normal. I have reviewed his CT scan images, There is no evidence of bowel compromise, perforation or obstruction. The cecum measures to 12.5 cm. We can attempt neostigmine for decompression. Dosage and precaution discussed with the ICU team. I will follow his course closely.

## 2017-01-17 NOTE — PROGRESS NOTE ADULT - SUBJECTIVE AND OBJECTIVE BOX
CTICU  CRITICAL  CARE  attending     Hand off received 					   Pertinent clinical, laboratory, radiographic, hemodynamic, echocardiographic, respiratory data, microbiologic data and chart were reviewed and analyzed frequently throughout the course of the day and night  Patient seen and examined with CTS/ SH attending at bedside  Pt is a 76y , Male, HEALTH ISSUES - PROBLEM Dx:      , FAMILY HISTORY:  PAST MEDICAL & SURGICAL HISTORY:  Prostate cancer  CVA (cerebral vascular accident)  Prostate cancer  Cervical stenosis of spine  Hiatal hernia  Temporal arteritis  Obstructive sleep apnea  Hyperlipidemia  Hypertension  Asthma  No pertinent past medical history  History of tonsillectomy  H/O: knee surgery  H/O prostatectomy  S/P CABG (coronary artery bypass graft)    Patient is a 76y old  Male who presents with a chief complaint of Thoracic Aortic Aneurysm (09 Jan 2017 15:30)  s/p ascending and hemiarch replacement pod 7  ileus    14 system review was unremarkable  acute changes include acute respiratory failure  Vital signs, hemodynamic and respiratory parameters were reviewed from the bedside nursing flowsheet.  ICU Vital Signs Last 24 Hrs  T(C): 36.4, Max: 36.4 (01-16 @ 18:31)  T(F): 97.6, Max: 97.6 (01-16 @ 18:31)  HR: 70 (60 - 92)  BP: 119/67 (100/53 - 146/63)  BP(mean): 84 (62 - 92)  ABP: --  ABP(mean): --  RR: 24 (14 - 26)  SpO2: 97% (95% - 100%)    Adult Advanced Hemodynamics Last 24 Hrs  CVP(mm Hg): --  CVP(cm H2O): --  CO: --  CI: --  PA: --  PA(mean): --  PCWP: --  SVR: --  SVRI: --  PVR: --  PVRI: --,     Intake and output was reviewed and the fluid balance was calculated  Daily     Daily   I&O's Summary      All lines and drain sites were assessed  Glycemic trend was reviewedCAPILLARY BLOOD GLUCOSE    No acute change in mental status  Auscultation of the chest reveals equal bs  Abdomen is soft  Extremities are warm and well perfused  Wounds appear clean and unremarkable  Antibiotics are periop    labs  CBC Full  -  ( 17 Jan 2017 02:35 )  WBC Count : 6.3 K/uL  Hemoglobin : 8.9 g/dL  Hematocrit : 26.5 %  Platelet Count - Automated : 212 K/uL  Mean Cell Volume : 79.1 fL  Mean Cell Hemoglobin : 26.6 pg  Mean Cell Hemoglobin Concentration : 33.6 g/dL  Auto Neutrophil # : x  Auto Lymphocyte # : x  Auto Monocyte # : x  Auto Eosinophil # : x  Auto Basophil # : x  Auto Neutrophil % : x  Auto Lymphocyte % : x  Auto Monocyte % : x  Auto Eosinophil % : x  Auto Basophil % : x    17 Jan 2017 09:38    140    |  107    |  21     ----------------------------<  112    3.9     |  25     |  0.98     Ca    8.4        17 Jan 2017 09:38  Phos  3.4       17 Jan 2017 09:38  Mg     2.0       17 Jan 2017 09:38    TPro  6.1    /  Alb  2.7    /  TBili  1.2    /  DBili  x      /  AST  23     /  ALT  28     /  AlkPhos  61     17 Jan 2017 09:38    PT/INR - ( 17 Jan 2017 02:35 )   PT: 13.8 sec;   INR: 1.24          PTT - ( 17 Jan 2017 02:35 )  PTT:34.6 sec  The current medications were reviewed   MEDICATIONS  (STANDING):  aspirin enteric coated 81milliGRAM(s) Oral daily  amiodarone    Tablet 200milliGRAM(s) Oral daily  famotidine    Tablet 20milliGRAM(s) Oral two times a day  buDESOnide   0.5 milliGRAM(s) Respule 0.5milliGRAM(s) Inhalation every 12 hours  FLUoxetine 40milliGRAM(s) Oral daily  metoprolol 12.5milliGRAM(s) Oral every 6 hours  heparin  Injectable 7500Unit(s) SubCutaneous every 8 hours  sodium chloride 0.9% lock flush 3milliLiter(s) IV Push every 8 hours  atorvastatin 40milliGRAM(s) Oral at bedtime  potassium chloride    Tablet ER 40milliEquivalent(s) Oral two times a day  furosemide   Injectable 40milliGRAM(s) IV Push two times a day    MEDICATIONS  (PRN):  acetaminophen    Suspension. 650milliGRAM(s) Oral every 6 hours PRN Mild Pain (1 - 3)  acetaminophen   Tablet. 650milliGRAM(s) Oral every 6 hours PRN Moderate Pain (4 - 6)       PROBLEM LIST/ ASSESSMENT:  HEALTH ISSUES - PROBLEM Dx:      ,   Patient is a 76y old  Male who presents with a chief complaint of Thoracic Aortic Aneurysm (09 Jan 2017 15:30)     s/p s/p ascending and hemiarch replacement pod 7  ileus  acute changes include acute respiratory failure    My plan includes :  close hemodynamic, ventilatory and drain monitoring and management per post op routine    Monitor for arrhythmias and monitor parameters for organ perfusion  monitor neurologic status  Head of the bed should remain elevated to 45 deg .   chest PT and IS will be encouraged  monitor adequacy of oxygenation and ventilation and attempt to wean oxygen  Nutritional goals will be met using po eventually , ensure adequate caloric intake and montior the same  Stress ulcer and VTE prophylaxis will be achieved    Glycemic control is satisfactory  Electrolytes have been repleted as necessary and wound care has been carried out. Pain control has been achieved.   agressive physical therapy and early mobility and ambulation goals will be met   The family was updated about the course and plan  CRITICAL CARE TIME SPENT in evaluation and management, reassessments, review and interpretation of labs and x-rays, ventilator and hemodynamic management, formulating a plan and coordinating care: ___90____ MIN.  Time does not include procedural time.  CTICU ATTENDING     					    Levy Ferrari MD

## 2017-01-17 NOTE — CONSULT NOTE ADULT - ASSESSMENT
We can attempt neostigmine for decompression. Dosage and precaution discussed with the ICU team. I will follow his course closely.

## 2017-01-17 NOTE — CONSULT NOTE ADULT - SUBJECTIVE AND OBJECTIVE BOX
GENERAL SURGERY CONSULT NOTE:    HPI:   76M w/ PMHx of CAD s/p CABG x 4 in '05, asthma, COPD, HTN, HLD, PAUL on CPAP, prostate cancer s/p radical prostatectomy in '03 (no chemo/xrt), who underwent elective ascending aortic hemiarch replacement w/ reimplantation of coronary arteries, EF 60%. He arrived in the ICU off pressors and was extubated POD1. His diet was advanced and pt has been tolerating DASH diet w/o N/V. On POD5, he developed diffuse abdominal tenderness & distension a/w passage of flatus w/ diarrhea--non-bloody, yellow, non-foul-smelling. Stool sample was sent for barrera stain (results pending). POD6 AXR showed nonspecific large bowel dilation up to 8.1cm, no pneumotosis or portal venous gas. Now, POD7, pt continues passing flatus w/ diarrheal BMs x 4, a/w occasional belching but no N/V. No solid BMs since OR. Pain has been managed without narcotics x 4d. Recent abx included Ancef 2g q8 x 5doses. No h/o chronic constipation or diarrhea. Patient is currently NPO pending CT A/P w/ PO contrast.    ALLERGIES:   Codeine     MEDICATIONS  (STANDING):  aspirin enteric coated 81milliGRAM(s) Oral daily  amiodarone    Tablet 200milliGRAM(s) Oral daily  famotidine    Tablet 20milliGRAM(s) Oral two times a day  buDESOnide   0.5 milliGRAM(s) Respule 0.5milliGRAM(s) Inhalation every 12 hours  FLUoxetine 40milliGRAM(s) Oral daily  metoprolol 12.5milliGRAM(s) Oral every 6 hours  heparin  Injectable 7500Unit(s) SubCutaneous every 8 hours  sodium chloride 0.9% lock flush 3milliLiter(s) IV Push every 8 hours  atorvastatin 40milliGRAM(s) Oral at bedtime  potassium chloride    Tablet ER 40milliEquivalent(s) Oral two times a day  furosemide   Injectable 40milliGRAM(s) IV Push two times a day  potassium chloride  20 mEq/100 mL IVPB 20milliEquivalent(s) IV Intermittent every 2 hours    MEDICATIONS  (PRN):  acetaminophen    Suspension. 650milliGRAM(s) Oral every 6 hours PRN Mild Pain (1 - 3)  acetaminophen   Tablet. 650milliGRAM(s) Oral every 6 hours PRN Moderate Pain (4 - 6)    PAST MEDICAL HISTORY:  Prostate cancer  CVA (cerebral vascular accident)  Prostate cancer  Cervical stenosis of spine  Hiatal hernia  Temporal arteritis  Obstructive sleep apnea  Hyperlipidemia  Hypertension  Asthma    PAST SURGICAL HISTORY:   tonsillectomy  knee surgery  radical prostatectomy - 2003  CABG (coronary artery bypass graft) x 4 - 2005    SOCIAL HISTORY:  . Lives w/ wife.   Drinks wine occasionally.   Former smoker- 15 pack-yrs.     REVIEW OF SYSTEMS:  Pertinent positives/negatives in HPI.     PHYSICAL EXAM:  Vital Signs: T(F): 97.3, Max: 97.6 (01-16 @ 18:31) / HR: 68 (60 - 92) / BP: 122/60 (100/53 - 146/82) / RR: 18 (14 - 24) / SpO2: 100% (95% - 100%)  NEURO: A&Ox3, NAD, RIVER  CV: RRR.   PULM: CTAB. No increased WOB.  ABD: Tensely distended, typanic abd, diffusely TTP, +BS x 4 quadrants.  EXTR: WWP.   WOUND: Sternotomy incision healing well.     LABS:              8.9    6.3   )-----------( 212      ( 17 Jan 2017 02:35 )             26.5     17 Jan 2017 09:38    140    |  107    |  21     ----------------------------<  112    3.9     |  25     |  0.98     Ca    8.4        17 Jan 2017 09:38  Phos  3.4       17 Jan 2017 09:38  Mg     2.0       17 Jan 2017 09:38    TPro  6.1    /  Alb  2.7    /  TBili  1.2    /  DBili  x      /  AST  23     /  ALT  28     /  AlkPhos  61     17 Jan 2017 09:38    PT/INR - ( 17 Jan 2017 02:35 )   PT: 13.8 sec;   INR: 1.24     PTT - ( 17 Jan 2017 02:35 )  PTT:34.6 sec

## 2017-01-17 NOTE — CONSULT NOTE ADULT - ASSESSMENT
76M POD7 s/p elective thoracic aortic aneurysm repair w/ post-op ileus.     -Will f/u CT A/P w/ PO contrast to r/o obstruction  -NPO pending CT results   -Minimize narcotics  -Encourage ambulation  -Other management per primary team   -Discussed w/ Surgery Chief & Attending   -Will continue to follow

## 2017-01-18 LAB
ALBUMIN SERPL ELPH-MCNC: 2.5 G/DL — LOW (ref 3.4–5)
ALP SERPL-CCNC: 53 U/L — SIGNIFICANT CHANGE UP (ref 40–120)
ALT FLD-CCNC: 32 U/L — SIGNIFICANT CHANGE UP (ref 12–42)
AMYLASE P1 CFR SERPL: 28 U/L — SIGNIFICANT CHANGE UP (ref 25–115)
ANION GAP SERPL CALC-SCNC: 8 MMOL/L — LOW (ref 9–16)
APTT BLD: 32.5 SEC — SIGNIFICANT CHANGE UP (ref 27.5–37.4)
AST SERPL-CCNC: 41 U/L — HIGH (ref 15–37)
BILIRUB DIRECT SERPL-MCNC: 0.1 MG/DL — SIGNIFICANT CHANGE UP
BILIRUB INDIRECT FLD-MCNC: 0.7 MG/DL — SIGNIFICANT CHANGE UP (ref 0.2–1)
BILIRUB SERPL-MCNC: 0.8 MG/DL — SIGNIFICANT CHANGE UP (ref 0.2–1.2)
BUN SERPL-MCNC: 19 MG/DL — SIGNIFICANT CHANGE UP (ref 7–23)
CALCIUM SERPL-MCNC: 8.3 MG/DL — LOW (ref 8.5–10.5)
CHLORIDE SERPL-SCNC: 111 MMOL/L — HIGH (ref 96–108)
CO2 SERPL-SCNC: 20 MMOL/L — LOW (ref 22–31)
CREAT SERPL-MCNC: 0.82 MG/DL — SIGNIFICANT CHANGE UP (ref 0.5–1.3)
GAS PNL BLDA: SIGNIFICANT CHANGE UP
GLUCOSE SERPL-MCNC: 96 MG/DL — SIGNIFICANT CHANGE UP (ref 70–99)
HCT VFR BLD CALC: 26.8 % — LOW (ref 39–50)
HGB BLD-MCNC: 8.9 G/DL — LOW (ref 13–17)
INR BLD: 1.25 — HIGH (ref 0.88–1.16)
LACTATE SERPL-SCNC: 0.7 MMOL/L — SIGNIFICANT CHANGE UP (ref 0.5–2)
LIDOCAIN IGE QN: 106 U/L — SIGNIFICANT CHANGE UP (ref 73–393)
MAGNESIUM SERPL-MCNC: 1.9 MG/DL — SIGNIFICANT CHANGE UP (ref 1.6–2.4)
MCHC RBC-ENTMCNC: 26.5 PG — LOW (ref 27–34)
MCHC RBC-ENTMCNC: 33.2 G/DL — SIGNIFICANT CHANGE UP (ref 32–36)
MCV RBC AUTO: 79.8 FL — LOW (ref 80–100)
PHOSPHATE SERPL-MCNC: 3 MG/DL — SIGNIFICANT CHANGE UP (ref 2.5–4.5)
PLATELET # BLD AUTO: 244 K/UL — SIGNIFICANT CHANGE UP (ref 150–400)
POTASSIUM SERPL-MCNC: 3.9 MMOL/L — SIGNIFICANT CHANGE UP (ref 3.5–5.3)
POTASSIUM SERPL-SCNC: 3.9 MMOL/L — SIGNIFICANT CHANGE UP (ref 3.5–5.3)
PROT SERPL-MCNC: 5.6 G/DL — LOW (ref 6.4–8.2)
PROTHROM AB SERPL-ACNC: 13.9 SEC — HIGH (ref 10–13.1)
RBC # BLD: 3.36 M/UL — LOW (ref 4.2–5.8)
RBC # FLD: 14.9 % — SIGNIFICANT CHANGE UP (ref 10.3–16.9)
SODIUM SERPL-SCNC: 139 MMOL/L — SIGNIFICANT CHANGE UP (ref 135–145)
WBC # BLD: 5.1 K/UL — SIGNIFICANT CHANGE UP (ref 3.8–10.5)
WBC # FLD AUTO: 5.1 K/UL — SIGNIFICANT CHANGE UP (ref 3.8–10.5)

## 2017-01-18 PROCEDURE — 99233 SBSQ HOSP IP/OBS HIGH 50: CPT

## 2017-01-18 PROCEDURE — 99291 CRITICAL CARE FIRST HOUR: CPT

## 2017-01-18 PROCEDURE — 74000: CPT | Mod: 26

## 2017-01-18 PROCEDURE — 71010: CPT | Mod: 26

## 2017-01-18 RX ORDER — PANTOPRAZOLE SODIUM 20 MG/1
40 TABLET, DELAYED RELEASE ORAL DAILY
Qty: 0 | Refills: 0 | Status: DISCONTINUED | OUTPATIENT
Start: 2017-01-18 | End: 2017-01-22

## 2017-01-18 RX ORDER — ACETAMINOPHEN 500 MG
1000 TABLET ORAL ONCE
Qty: 0 | Refills: 0 | Status: DISCONTINUED | OUTPATIENT
Start: 2017-01-18 | End: 2017-01-22

## 2017-01-18 RX ORDER — AMIODARONE HYDROCHLORIDE 400 MG/1
150 TABLET ORAL DAILY
Qty: 0 | Refills: 0 | Status: COMPLETED | OUTPATIENT
Start: 2017-01-18 | End: 2017-01-18

## 2017-01-18 RX ORDER — POTASSIUM CHLORIDE 20 MEQ
20 PACKET (EA) ORAL ONCE
Qty: 0 | Refills: 0 | Status: COMPLETED | OUTPATIENT
Start: 2017-01-18 | End: 2017-01-18

## 2017-01-18 RX ORDER — AMIODARONE HYDROCHLORIDE 400 MG/1
150 TABLET ORAL DAILY
Qty: 0 | Refills: 0 | Status: DISCONTINUED | OUTPATIENT
Start: 2017-01-19 | End: 2017-01-22

## 2017-01-18 RX ORDER — POTASSIUM CHLORIDE 20 MEQ
20 PACKET (EA) ORAL
Qty: 0 | Refills: 0 | Status: COMPLETED | OUTPATIENT
Start: 2017-01-18 | End: 2017-01-18

## 2017-01-18 RX ADMIN — PANTOPRAZOLE SODIUM 40 MILLIGRAM(S): 20 TABLET, DELAYED RELEASE ORAL at 11:52

## 2017-01-18 RX ADMIN — SODIUM CHLORIDE 142.86 MILLILITER(S): 9 INJECTION, SOLUTION INTRAVENOUS at 01:00

## 2017-01-18 RX ADMIN — Medication 100 MILLIEQUIVALENT(S): at 06:00

## 2017-01-18 RX ADMIN — SODIUM CHLORIDE 3 MILLILITER(S): 9 INJECTION INTRAMUSCULAR; INTRAVENOUS; SUBCUTANEOUS at 04:46

## 2017-01-18 RX ADMIN — HEPARIN SODIUM 7500 UNIT(S): 5000 INJECTION INTRAVENOUS; SUBCUTANEOUS at 21:50

## 2017-01-18 RX ADMIN — Medication 2.5 MILLIGRAM(S): at 18:52

## 2017-01-18 RX ADMIN — Medication 100 MILLIEQUIVALENT(S): at 00:27

## 2017-01-18 RX ADMIN — Medication 100 MILLIEQUIVALENT(S): at 07:55

## 2017-01-18 RX ADMIN — Medication 100 MILLIEQUIVALENT(S): at 02:00

## 2017-01-18 RX ADMIN — Medication 40 MILLIGRAM(S): at 05:14

## 2017-01-18 RX ADMIN — Medication 0.5 MILLIGRAM(S): at 18:11

## 2017-01-18 RX ADMIN — SODIUM CHLORIDE 75 MILLILITER(S): 9 INJECTION, SOLUTION INTRAVENOUS at 18:59

## 2017-01-18 RX ADMIN — SODIUM CHLORIDE 3 MILLILITER(S): 9 INJECTION INTRAMUSCULAR; INTRAVENOUS; SUBCUTANEOUS at 14:42

## 2017-01-18 RX ADMIN — Medication 1 ENEMA: at 14:42

## 2017-01-18 RX ADMIN — Medication 2.5 MILLIGRAM(S): at 05:13

## 2017-01-18 RX ADMIN — SODIUM CHLORIDE 3 MILLILITER(S): 9 INJECTION INTRAMUSCULAR; INTRAVENOUS; SUBCUTANEOUS at 21:47

## 2017-01-18 RX ADMIN — FAMOTIDINE 20 MILLIGRAM(S): 10 INJECTION INTRAVENOUS at 05:14

## 2017-01-18 RX ADMIN — Medication 0.5 MILLIGRAM(S): at 06:22

## 2017-01-18 RX ADMIN — Medication 2.5 MILLIGRAM(S): at 11:49

## 2017-01-18 RX ADMIN — HEPARIN SODIUM 7500 UNIT(S): 5000 INJECTION INTRAVENOUS; SUBCUTANEOUS at 05:14

## 2017-01-18 RX ADMIN — AMIODARONE HYDROCHLORIDE 200 MILLIGRAM(S): 400 TABLET ORAL at 09:58

## 2017-01-18 RX ADMIN — Medication 40 MILLIGRAM(S): at 19:03

## 2017-01-18 RX ADMIN — Medication 2.5 MILLIGRAM(S): at 00:20

## 2017-01-18 RX ADMIN — HEPARIN SODIUM 7500 UNIT(S): 5000 INJECTION INTRAVENOUS; SUBCUTANEOUS at 15:46

## 2017-01-18 RX ADMIN — Medication 100 MILLIEQUIVALENT(S): at 04:44

## 2017-01-18 NOTE — CONSULT NOTE ADULT - ASSESSMENT
76 year old male with history of CAD s/p CABG x4 in 2005, asthma, COPD, hypertension, hyperlipidemia, PAUL, Temporal arteritis, hiatal hernia, cervical and lumbar stenosis, prostate cancer s/p radial prostatectomy in 2003 (no radiation or chemo) who presents for elective repair of thoracic aortic aneurysm s/p ascending and hemiarch replacement. course complicated by ileus     #constipation - patient with gaseous abdominal distension, at one point impacting respiratory effort, worsening on POD5-7. Now improved after 1x dose of neostigmine and subsequent BM. Likely post op ileus.  Patient abdomen is still distended, but non tender. No signs alarming for compromise of bowel wall or perforation. In fact patient is resting comfortably.   AXR shows improved gas pattern, but still large amounts of trapped air; pending official read. No role for endoscopy at this time as risk of procedure on unprepped patient outweighs the potential benefit in this patient who is improving with conservative management.  -NGT for further decompression  -repeat AXR in AM  -enema in AM   -when moving regularly and ilesu resolved, begin miralax

## 2017-01-18 NOTE — CONSULT NOTE ADULT - SUBJECTIVE AND OBJECTIVE BOX
76 year old male with history of CAD s/p CABG x4 in 2005, asthma, COPD, hypertension, hyperlipidemia, PAUL, Temporal arteritis, hiatal hernia, cervical and lumbar stenosis, prostate cancer s/p radial prostatectomy in 2003 (no radiation or chemo) who presents for elective repair of thoracic aortic aneurysm s/p ascending and hemiarch replacement. course complicated by ileus     Patient reports 2 days of abdominal distension with discomfort and progressive difficulty breathing, now resolved. Pt is now POD 7, he says that he was having liquid bms directly after the surgery with flatus, however in 2 days abdominal distension. He has had surgeries in the past with constipation resolved with stool softeners, but never symptoms such as this. He has had no abdominal pain and no n/v. No fevers or chills either.   He was given neostigmine with large volume liquid stool after. HE says he felt great relief and is breathing better and moving around better as well.   last colonoscopy was 5 years ago and was normal, he does not recall the gastroenterologists name.    REVIEW OF SYSTEMS:  Constitutional: No fever, weight loss or fatigue  ENMT:  No difficulty hearing, tinnitus, vertigo; No sinus or throat pain  Respiratory: No cough, wheezing, chills or hemoptysis  Cardiovascular: No chest pain, palpitations, dizziness or leg swelling  Gastrointestinal: No abdominal or epigastric pain. No nausea, vomiting or hematemesis;  No melena or hematochezia.  Skin: No itching, burning, rashes or lesions   Musculoskeletal: No joint pain or swelling; No muscle, back or extremity pain    PAST MEDICAL & SURGICAL HISTORY:  Prostate cancer  CVA (cerebral vascular accident)  Prostate cancer  Cervical stenosis of spine  Hiatal hernia  Temporal arteritis  Obstructive sleep apnea  Hyperlipidemia  Hypertension  Asthma  No pertinent past medical history  History of tonsillectomy  H/O: knee surgery  H/O prostatectomy  S/P CABG (coronary artery bypass graft)      FAMILY HISTORY:      SOCIAL HISTORY:  Smoking Status: [ ] Current, [ ] Former, [ ] Never  Pack Years:    MEDICATIONS:  MEDICATIONS  (STANDING):  aspirin enteric coated 81milliGRAM(s) Oral daily  amiodarone    Tablet 200milliGRAM(s) Oral daily  buDESOnide   0.5 milliGRAM(s) Respule 0.5milliGRAM(s) Inhalation every 12 hours  FLUoxetine 40milliGRAM(s) Oral daily  heparin  Injectable 7500Unit(s) SubCutaneous every 8 hours  sodium chloride 0.9% lock flush 3milliLiter(s) IV Push every 8 hours  atorvastatin 40milliGRAM(s) Oral at bedtime  furosemide   Injectable 40milliGRAM(s) IV Push two times a day  lactated ringers. 1000milliLiter(s) IV Continuous <Continuous>  lactated ringers Bolus 1000milliLiter(s) IV Bolus once  metoprolol Injectable 2.5milliGRAM(s) IV Push every 6 hours  famotidine Injectable 20milliGRAM(s) IV Push every 12 hours  potassium chloride  20 mEq/100 mL IVPB 20milliEquivalent(s) IV Intermittent every 1 hour    MEDICATIONS  (PRN):      Allergies    codeine (Unknown)    Intolerances        Vital Signs Last 24 Hrs  T(C): 36.7, Max: 36.7 (01-17 @ 21:47)  T(F): 98.1, Max: 98.1 (01-17 @ 21:47)  HR: 82 (60 - 94)  BP: 140/60 (100/53 - 146/63)  BP(mean): 89 (66 - 91)  RR: 23 (14 - 26)  SpO2: 98% (95% - 100%)  I & Os for 24h ending 01-17 @ 07:00  =============================================  IN: 700 ml / OUT: 2425 ml / NET: -1725 ml    I & Os for current day (as of 01-18 @ 00:03)  =============================================  IN: 3480 ml / OUT: 600 ml / NET: 2880 ml        PHYSICAL EXAM:    General: Well developed; well nourished; in no acute distress  HEENT: MMM, conjunctiva and sclera clear  Gastrointestinal: large with gasous distension, but Soft, non-tender, No rebound or guarding,  Normal bowel sounds;  Extremities: Normal range of motion, No clubbing, cyanosis or edema  Neurological: Alert and oriented x3  Skin: Warm and dry. No obvious rash      LABS:                        9.3    6.3   )-----------( 267      ( 17 Jan 2017 21:28 )             27.9     17 Jan 2017 21:28    138    |  108    |  20     ----------------------------<  112    3.2     |  21     |  0.86     Ca    8.4        17 Jan 2017 21:28  Phos  3.4       17 Jan 2017 09:38  Mg     2.0       17 Jan 2017 09:38    TPro  5.9    /  Alb  2.7    /  TBili  0.8    /  DBili  0.18   /  AST  22     /  ALT  31     /  AlkPhos  59     17 Jan 2017 21:28        RADIOLOGY & ADDITIONAL STUDIES:       Diffuse colonic dilatation with air/fluids levels and no significant wall   thickening which is a finding that may be seen with diarrhea or colonic   ileus. An obstructing lesion in the anal verge could be excluded with   digital examination. Maximal colonic distention is 12.8 cm at the cecum,   which is concerning, recommend clinical correlation for signs of   toxicity, close follow-up and surgical evaluation.    4 cm distal thoracic aortic aneurysm, 4.2 cm suprarenal abdominal aortic   aneurysm.

## 2017-01-18 NOTE — PROGRESS NOTE ADULT - SUBJECTIVE AND OBJECTIVE BOX
INTERVAL HPI/OVERNIGHT EVENTS:    POD #8 reop sternotomy; ascending aorta/hemiarch replacement with re-implantation coronaries     77yo male with Hx CAD/CABG x4 '05, asthma, COPD, HTN, chol, PAUL on qhs CPAP, Temporal arteritis, hiatal hernia, cervical and lumbar stenosis, CVA, prostate cancer s/p radial prostatectomy '03 and known thoracic aortic aneurysm presenting for elective repair    aneurysm diagnosed incidentally on routine diagnostic imaging and followed with serial imaging.  CT Chest (6/28/16): ascending aorta (6.2. x 5.9cm); aortic arch 4.9cm descending aorta 4 x3.6cm and abdominal aorta 4.2cm.   initial planned procedure in Nov '16 deferred - preop cath (patent grafts) complicated by post procedural ischemic stroke(w/o residual neuro deficits) - Elective procedure postponed 6 weeks     to OR 1/10 - reop sternotomy - ascending aortic/hemiarch replacement with reimplantation of coronaries    Extubated 1/11 and pressors weaned off in short post-op period  noted post-op atrial fib - amiodarone and metoprolol started  pacing wires in place  nightly home BIPAP - patients home settings    Post-op Ileus/GI consulted - Neostigmine started with improvement reported in abd distention    No acute events reported overnight - patient currently OOB to chair  still having soft/watery BM per staff  currently with TLC - one port being held for possible parental nutrition should it be required    d/w GI this am and imaging reviewed -   recommend NGT decompression and enemas    PMHx includes but is not limited to:  CAD Hx CABG  Prostate cancer Hx prostatectomy  CVA - no residual deficits  Cervical stenosis of spine  Hiatal hernia  Temporal arteritis  Obstructive sleep apnea on CPAP  Hyperlipidemia  Hypertension  Asthma  Ascending aortic aneurysm  History of tonsillectomy  Hx knee surgery    ICU Vital Signs Last 24 Hrs  T(C): 36, Max: 36.7 (01-17 @ 21:47)  T(F): 96.8, Max: 98.1 (01-17 @ 21:47)  HR: 80 (60 - 100) sinus  BP: 140/60 (112/55 - 146/63)  BP(mean): 89 (66 - 91)  ABP: 122/48 (108/48 - 150/60)  ABP(mean): 72 (70 - 90)  SpO2: 97% (90% - 100%) RA    Qtts: None    Physical Exam    Heart - regular (-)rub/gallop  Lungs - dec BS at bases (-)wheeze/rhonchi  Abd (+)BS distended but soft - no rebound/rigidity/guarding  Ext - no c/c/e; warm to touch -   Chest -   Neuro - alert/oriented x 3; non-focal; moving all extremities  Skin - no rash    LABS:                        8.9    5.1   )-----------( 244      ( 18 Jan 2017 03:30 )             26.8     18 Jan 2017 03:30    139    |  111    |  19     ----------------------------<  96     3.9     |  20     |  0.82     Ca    8.3        18 Jan 2017 03:30  Phos  3.0       18 Jan 2017 03:30  Mg     1.9       18 Jan 2017 03:30    TPro  5.6    /  Alb  2.5    /  TBili  0.8    /  DBili  0.10   /  AST  41     /  ALT  32     /  AlkPhos  53     18 Jan 2017 03:30    PT/INR - ( 18 Jan 2017 03:30 )   PT: 13.9 sec;   INR: 1.25          PTT - ( 18 Jan 2017 03:30 )  PTT:32.5 sec    ABG - ( 18 Jan 2017 03:27 )  pH: 7.43  /  pCO2: 27    /  pO2: 83    / HCO3: 18    / Base Excess: -5.6  /  SaO2: 96                  RADIOLOGY & ADDITIONAL STUDIES:    CRITICAL CARE TIME SPENT: INTERVAL HPI/OVERNIGHT EVENTS:    POD #8 reop sternotomy; ascending aorta/hemiarch replacement with re-implantation coronaries     77yo male with Hx CAD/CABG x4 '05, asthma, COPD, HTN, chol, PAUL on qhs CPAP, Temporal arteritis, hiatal hernia, cervical and lumbar stenosis, CVA, prostate cancer s/p radial prostatectomy '03 and known thoracic aortic aneurysm presenting for elective repair    aneurysm diagnosed incidentally on routine diagnostic imaging and followed with serial imaging.  CT Chest (6/28/16): ascending aorta (6.2. x 5.9cm); aortic arch 4.9cm descending aorta 4 x3.6cm and abdominal aorta 4.2cm.   initial planned procedure in Nov '16 deferred - preop cath (patent grafts) complicated by post procedural ischemic stroke(w/o residual neuro deficits) - Elective procedure postponed 6 weeks     to OR 1/10 - reop sternotomy - ascending aortic/hemiarch replacement with reimplantation of coronaries    Extubated 1/11 and pressors weaned off in short post-op period  noted post-op atrial fib - amiodarone and metoprolol started  pacing wires in place  nightly home BIPAP - patients home settings    Post-op Ileus/GI consulted - Neostigmine started with improvement reported in abd distention    No acute events reported overnight - patient currently OOB to chair  still having soft/watery BM per staff  currently with TLC - one port being held for possible parental nutrition should it be required    d/w GI this am and imaging reviewed -   recommend NGT decompression and enemas  patient currently refusing NGT placement but wishes to go forward with ememas for now  GI called to inform    PMHx includes but is not limited to:  CAD Hx CABG  Prostate cancer Hx prostatectomy  CVA - no residual deficits  Cervical stenosis of spine  Hiatal hernia  Temporal arteritis  Obstructive sleep apnea on CPAP  Hyperlipidemia  Hypertension  Asthma  Ascending aortic aneurysm  History of tonsillectomy  Hx knee surgery    ICU Vital Signs Last 24 Hrs  T(C): 36, Max: 36.7 (01-17 @ 21:47)  T(F): 96.8, Max: 98.1 (01-17 @ 21:47)  HR: 80 (60 - 100) sinus  BP: 140/60 (112/55 - 146/63)  BP(mean): 89 (66 - 91)  ABP: 122/48 (108/48 - 150/60)  ABP(mean): 72 (70 - 90)  SpO2: 97% (90% - 100%) RA    Qtts: None    Physical Exam    Heart - regular (-)rub/gallop  Lungs - dec BS at bases (-)wheeze/rhonchi  Abd (+)BS distended but soft - no rebound/rigidity/guarding  Ext - no c/c/e; warm to touch -   Chest - incision sites clean/dry - no discharge/streaking/erythema  Neuro - alert/oriented x 3; non-focal; moving all extremities  Skin - no rash    LABS:                        8.9    5.1   )-----------( 244      ( 18 Jan 2017 03:30 )             26.8     18 Jan 2017 03:30    139    |  111    |  19     ----------------------------<  96     3.9     |  20     |  0.82     Ca    8.3        18 Jan 2017 03:30  Phos  3.0       18 Jan 2017 03:30  Mg     1.9       18 Jan 2017 03:30    TPro  5.6    /  Alb  2.5    /  TBili  0.8    /  DBili  0.10   /  AST  41     /  ALT  32     /  AlkPhos  53     18 Jan 2017 03:30    PT/INR - ( 18 Jan 2017 03:30 )   PT: 13.9 sec;   INR: 1.25     PTT - ( 18 Jan 2017 03:30 )  PTT:32.5 sec    ABG - ( 18 Jan 2017 03:27 ) 7.43/27/83/96    RADIOLOGY & ADDITIONAL STUDIES: reviewed    Patient now POD #8 reop sternotomy; ascending aorta/hemiarch replacement with re-implantation coronaries with post-op ileus    1. CV -   Hemodynamically stable  cont ASA  all meds IV in light of GI/NPO/ileus  lasix 40 IV BID/Metoprolol 2.5 IV q6h  amiodarone currently po (200md po qd) - change to IV 150mg once daily  no IV equivalent of statin - currently on hold    2. GI - post-op ileus  s/p neostigmine  GI (Dr Feliberto Traore) and Surgical (Dr velma archuleta) services following  still with significant distention on imaging this am - largely unchanged since prior  patient refusing NGT today - ok to proceed with enemas  monitor abd exam closely  NPO for now    3. Pulm -   incentive spirometry and ambulation ongoing   cont nebs    DVT prophylaxis  per medex review - No GI prophylaxis - start protonix 40 IV qd    CRITICAL CARE TIME SPENT: 60 min

## 2017-01-18 NOTE — CONSULT NOTE ADULT - ATTENDING COMMENTS
Patient was seen and examined with the GI fellow, Dr. Jennings. I agree with the excellent assessment and plan as outlined above.    Thank you for this consult. Please do not hesitate to contact us with any further questions.

## 2017-01-18 NOTE — PROGRESS NOTE ADULT - SUBJECTIVE AND OBJECTIVE BOX
Pt seen and examined. Since seeing him last night he has passed more loose bm with some gas, and still feeling better since dose of neostigmine.  No n/v, no abdominal pain    REVIEW OF SYSTEMS:  Constitutional: No fever, weight loss or fatigue  Cardiovascular: No chest pain, palpitations, dizziness or leg swelling  Gastrointestinal: No abdominal or epigastric pain. No nausea, vomiting or hematemesis;  No melena or hematochezia.  Skin: No itching, burning, rashes or lesions       MEDICATIONS:  MEDICATIONS  (STANDING):  aspirin enteric coated 81milliGRAM(s) Oral daily  amiodarone    Tablet 200milliGRAM(s) Oral daily  buDESOnide   0.5 milliGRAM(s) Respule 0.5milliGRAM(s) Inhalation every 12 hours  FLUoxetine 40milliGRAM(s) Oral daily  heparin  Injectable 7500Unit(s) SubCutaneous every 8 hours  sodium chloride 0.9% lock flush 3milliLiter(s) IV Push every 8 hours  atorvastatin 40milliGRAM(s) Oral at bedtime  furosemide   Injectable 40milliGRAM(s) IV Push two times a day  lactated ringers. 1000milliLiter(s) IV Continuous <Continuous>  metoprolol Injectable 2.5milliGRAM(s) IV Push every 6 hours  potassium chloride  20 mEq/100 mL IVPB 20milliEquivalent(s) IV Intermittent every 1 hour    MEDICATIONS  (PRN):      Allergies    codeine (Unknown)    Intolerances        Vital Signs Last 24 Hrs  T(C): 36, Max: 36.7 (01-17 @ 21:47)  T(F): 96.8, Max: 98.1 (01-17 @ 21:47)  HR: 80 (60 - 100)  BP: 140/60 (112/55 - 146/63)  BP(mean): 89 (66 - 91)  RR: 15 (15 - 28)  SpO2: 97% (90% - 100%)    I & Os for current day (as of 01-18 @ 07:51)  =============================================  IN: 4590 ml / OUT: 1250 ml / NET: 3340 ml      PHYSICAL EXAM:    General: Well developed; well nourished; in no acute distress  HEENT: MMM, conjunctiva and sclera clear  Gastrointestinal: Soft non-tender +gaseous distension; active bowel sounds; No hepatosplenomegaly. No rebound or guarding  Skin: Warm and dry. No obvious rash    LABS:  CBC Full  -  ( 18 Jan 2017 03:30 )  WBC Count : 5.1 K/uL  Hemoglobin : 8.9 g/dL  Hematocrit : 26.8 %  Platelet Count - Automated : 244 K/uL  Mean Cell Volume : 79.8 fL  Mean Cell Hemoglobin : 26.5 pg  Mean Cell Hemoglobin Concentration : 33.2 g/dL  Auto Neutrophil # : x  Auto Lymphocyte # : x  Auto Monocyte # : x  Auto Eosinophil # : x  Auto Basophil # : x  Auto Neutrophil % : x  Auto Lymphocyte % : x  Auto Monocyte % : x  Auto Eosinophil % : x  Auto Basophil % : x    18 Jan 2017 03:30    139    |  111    |  19     ----------------------------<  96     3.9     |  20     |  0.82     Ca    8.3        18 Jan 2017 03:30  Phos  3.0       18 Jan 2017 03:30  Mg     1.9       18 Jan 2017 03:30    TPro  5.6    /  Alb  2.5    /  TBili  0.8    /  DBili  0.10   /  AST  41     /  ALT  32     /  AlkPhos  53     18 Jan 2017 03:30    PT/INR - ( 18 Jan 2017 03:30 )   PT: 13.9 sec;   INR: 1.25          PTT - ( 18 Jan 2017 03:30 )  PTT:32.5 sec        RADIOLOGY & ADDITIONAL STUDIES:  AP view of the abdomen demonstrate air-filled loops of large bowel. No   significant air-fluid levels are seen. No free air seen on this limited   view.

## 2017-01-18 NOTE — PROGRESS NOTE ADULT - SUBJECTIVE AND OBJECTIVE BOX
ALLEGRA MAMADOU  [id number]  76y  Male  codeine (Unknown)    Patient seen and examined at bedside. CT scan overnight indicated dilated colon ~12cm; was given neostigmine overnight with resultant significant bowel movements and significant flatus; continued into the morning. Patient reports feeling better afterwards, less bloated, less abdominal discomfort.    T(C): 36, Max: 36.7 (01-17 @ 21:47)  HR: 95 (62 - 100)  BP: 134/64 (112/55 - 146/63)  RR: 16 (15 - 28)  SpO2: 95% (84% - 100%)  Wt(kg): --    PE:  Gen: NAD  Chest: tachycardic, sternal incision c/d/i  Abd: soft, mildly distended, nontender, no rebound      I & Os for current day (as of 01-18 @ 09:35)  =============================================  IN: 4590 ml / OUT: 1250 ml / NET: 3340 ml                            8.9    5.1   )-----------( 244      ( 18 Jan 2017 03:30 )             26.8       18 Jan 2017 03:30    139    |  111    |  19     ----------------------------<  96     3.9     |  20     |  0.82     Ca    8.3        18 Jan 2017 03:30  Phos  3.0       18 Jan 2017 03:30  Mg     1.9       18 Jan 2017 03:30    TPro  5.6    /  Alb  2.5    /  TBili  0.8    /  DBili  0.10   /  AST  41     /  ALT  32     /  AlkPhos  53     18 Jan 2017 03:30          aspirin enteric coated 81milliGRAM(s) Oral daily  buDESOnide   0.5 milliGRAM(s) Respule 0.5milliGRAM(s) Inhalation every 12 hours  FLUoxetine 40milliGRAM(s) Oral daily  heparin  Injectable 7500Unit(s) SubCutaneous every 8 hours  sodium chloride 0.9% lock flush 3milliLiter(s) IV Push every 8 hours  atorvastatin 40milliGRAM(s) Oral at bedtime  furosemide   Injectable 40milliGRAM(s) IV Push two times a day  lactated ringers. 1000milliLiter(s) IV Continuous <Continuous>  metoprolol Injectable 2.5milliGRAM(s) IV Push every 6 hours  amiodarone IVPB 150milliGRAM(s) IV Intermittent daily  pantoprazole  Injectable 40milliGRAM(s) IV Push daily      I71.2  No h/o HF  Unknown h/o HF  Handoff  MEWS Score  Prostate cancer  CVA (cerebral vascular accident)  Prostate cancer  Cervical stenosis of spine  Hiatal hernia  Temporal arteritis  Obstructive sleep apnea  Hyperlipidemia  Hypertension  Asthma  No pertinent past medical history  Ascending aortic aneurysm  Ascending aortic aneurysm  History of tonsillectomy  H/O: knee surgery  H/O prostatectomy  S/P CABG (coronary artery bypass graft)

## 2017-01-18 NOTE — PROGRESS NOTE ADULT - ASSESSMENT
76M s/p aortic arch replacement c/b colonic pseduo-obstruction  Neostigmine with good effect. No acute surgical intervention warranted at this time. Continue with care per CTICU.

## 2017-01-19 LAB
ALBUMIN SERPL ELPH-MCNC: 2.7 G/DL — LOW (ref 3.4–5)
ALBUMIN SERPL ELPH-MCNC: 2.8 G/DL — LOW (ref 3.4–5)
ALP SERPL-CCNC: 57 U/L — SIGNIFICANT CHANGE UP (ref 40–120)
ALP SERPL-CCNC: 60 U/L — SIGNIFICANT CHANGE UP (ref 40–120)
ALT FLD-CCNC: 38 U/L — SIGNIFICANT CHANGE UP (ref 12–42)
ALT FLD-CCNC: 38 U/L — SIGNIFICANT CHANGE UP (ref 12–42)
ANION GAP SERPL CALC-SCNC: 11 MMOL/L — SIGNIFICANT CHANGE UP (ref 9–16)
ANION GAP SERPL CALC-SCNC: 12 MMOL/L — SIGNIFICANT CHANGE UP (ref 9–16)
ANION GAP SERPL CALC-SCNC: 13 MMOL/L — SIGNIFICANT CHANGE UP (ref 9–16)
ANION GAP SERPL CALC-SCNC: 15 MMOL/L — SIGNIFICANT CHANGE UP (ref 9–16)
APTT BLD: 31 SEC — SIGNIFICANT CHANGE UP (ref 27.5–37.4)
APTT BLD: 33.2 SEC — SIGNIFICANT CHANGE UP (ref 27.5–37.4)
AST SERPL-CCNC: 21 U/L — SIGNIFICANT CHANGE UP (ref 15–37)
AST SERPL-CCNC: 23 U/L — SIGNIFICANT CHANGE UP (ref 15–37)
BASE EXCESS BLDV CALC-SCNC: -3.5 MMOL/L — SIGNIFICANT CHANGE UP
BILIRUB SERPL-MCNC: 0.6 MG/DL — SIGNIFICANT CHANGE UP (ref 0.2–1.2)
BILIRUB SERPL-MCNC: 0.7 MG/DL — SIGNIFICANT CHANGE UP (ref 0.2–1.2)
BUN SERPL-MCNC: 16 MG/DL — SIGNIFICANT CHANGE UP (ref 7–23)
BUN SERPL-MCNC: 16 MG/DL — SIGNIFICANT CHANGE UP (ref 7–23)
BUN SERPL-MCNC: 17 MG/DL — SIGNIFICANT CHANGE UP (ref 7–23)
BUN SERPL-MCNC: 18 MG/DL — SIGNIFICANT CHANGE UP (ref 7–23)
CALCIUM SERPL-MCNC: 7.8 MG/DL — LOW (ref 8.5–10.5)
CALCIUM SERPL-MCNC: 8.2 MG/DL — LOW (ref 8.5–10.5)
CALCIUM SERPL-MCNC: 8.2 MG/DL — LOW (ref 8.5–10.5)
CALCIUM SERPL-MCNC: 8.3 MG/DL — LOW (ref 8.5–10.5)
CHLORIDE SERPL-SCNC: 107 MMOL/L — SIGNIFICANT CHANGE UP (ref 96–108)
CHLORIDE SERPL-SCNC: 107 MMOL/L — SIGNIFICANT CHANGE UP (ref 96–108)
CHLORIDE SERPL-SCNC: 109 MMOL/L — HIGH (ref 96–108)
CHLORIDE SERPL-SCNC: 110 MMOL/L — HIGH (ref 96–108)
CO2 SERPL-SCNC: 18 MMOL/L — LOW (ref 22–31)
CO2 SERPL-SCNC: 21 MMOL/L — LOW (ref 22–31)
CO2 SERPL-SCNC: 22 MMOL/L — SIGNIFICANT CHANGE UP (ref 22–31)
CO2 SERPL-SCNC: 22 MMOL/L — SIGNIFICANT CHANGE UP (ref 22–31)
CREAT SERPL-MCNC: 0.74 MG/DL — SIGNIFICANT CHANGE UP (ref 0.5–1.3)
CREAT SERPL-MCNC: 0.8 MG/DL — SIGNIFICANT CHANGE UP (ref 0.5–1.3)
CREAT SERPL-MCNC: 0.91 MG/DL — SIGNIFICANT CHANGE UP (ref 0.5–1.3)
CREAT SERPL-MCNC: 0.98 MG/DL — SIGNIFICANT CHANGE UP (ref 0.5–1.3)
GAS PNL BLDA: SIGNIFICANT CHANGE UP
GAS PNL BLDA: SIGNIFICANT CHANGE UP
GAS PNL BLDV: SIGNIFICANT CHANGE UP
GLUCOSE SERPL-MCNC: 107 MG/DL — HIGH (ref 70–99)
GLUCOSE SERPL-MCNC: 113 MG/DL — HIGH (ref 70–99)
GLUCOSE SERPL-MCNC: 95 MG/DL — SIGNIFICANT CHANGE UP (ref 70–99)
GLUCOSE SERPL-MCNC: 96 MG/DL — SIGNIFICANT CHANGE UP (ref 70–99)
HCO3 BLDV-SCNC: 20 MMOL/L — SIGNIFICANT CHANGE UP (ref 20–27)
HCT VFR BLD CALC: 27 % — LOW (ref 39–50)
HCT VFR BLD CALC: 27 % — LOW (ref 39–50)
HCT VFR BLD CALC: 27.6 % — LOW (ref 39–50)
HGB BLD-MCNC: 8.9 G/DL — LOW (ref 13–17)
HGB BLD-MCNC: 9 G/DL — LOW (ref 13–17)
HGB BLD-MCNC: 9.2 G/DL — LOW (ref 13–17)
INR BLD: 1.29 — HIGH (ref 0.88–1.16)
INR BLD: 1.29 — HIGH (ref 0.88–1.16)
LACTATE SERPL-SCNC: 1 MMOL/L — SIGNIFICANT CHANGE UP (ref 0.5–2)
MAGNESIUM SERPL-MCNC: 1.7 MG/DL — SIGNIFICANT CHANGE UP (ref 1.6–2.4)
MAGNESIUM SERPL-MCNC: 2.3 MG/DL — SIGNIFICANT CHANGE UP (ref 1.6–2.4)
MAGNESIUM SERPL-MCNC: 3.3 MG/DL — HIGH (ref 1.6–2.4)
MCHC RBC-ENTMCNC: 26.5 PG — LOW (ref 27–34)
MCHC RBC-ENTMCNC: 26.6 PG — LOW (ref 27–34)
MCHC RBC-ENTMCNC: 26.7 PG — LOW (ref 27–34)
MCHC RBC-ENTMCNC: 33 G/DL — SIGNIFICANT CHANGE UP (ref 32–36)
MCHC RBC-ENTMCNC: 33.3 G/DL — SIGNIFICANT CHANGE UP (ref 32–36)
MCHC RBC-ENTMCNC: 33.3 G/DL — SIGNIFICANT CHANGE UP (ref 32–36)
MCV RBC AUTO: 79.8 FL — LOW (ref 80–100)
MCV RBC AUTO: 80.1 FL — SIGNIFICANT CHANGE UP (ref 80–100)
MCV RBC AUTO: 80.4 FL — SIGNIFICANT CHANGE UP (ref 80–100)
PCO2 BLDV: 30 MMHG — LOW (ref 41–51)
PH BLDV: 7.44 — HIGH (ref 7.32–7.43)
PHOSPHATE SERPL-MCNC: 2.7 MG/DL — SIGNIFICANT CHANGE UP (ref 2.5–4.5)
PHOSPHATE SERPL-MCNC: 3.5 MG/DL — SIGNIFICANT CHANGE UP (ref 2.5–4.5)
PLATELET # BLD AUTO: 249 K/UL — SIGNIFICANT CHANGE UP (ref 150–400)
PLATELET # BLD AUTO: 266 K/UL — SIGNIFICANT CHANGE UP (ref 150–400)
PLATELET # BLD AUTO: 323 K/UL — SIGNIFICANT CHANGE UP (ref 150–400)
PO2 BLDV: 46 MMHG — SIGNIFICANT CHANGE UP
POTASSIUM SERPL-MCNC: 2.7 MMOL/L — CRITICAL LOW (ref 3.5–5.3)
POTASSIUM SERPL-MCNC: 2.9 MMOL/L — CRITICAL LOW (ref 3.5–5.3)
POTASSIUM SERPL-MCNC: 2.9 MMOL/L — CRITICAL LOW (ref 3.5–5.3)
POTASSIUM SERPL-MCNC: 3.2 MMOL/L — LOW (ref 3.5–5.3)
POTASSIUM SERPL-SCNC: 2.7 MMOL/L — CRITICAL LOW (ref 3.5–5.3)
POTASSIUM SERPL-SCNC: 2.9 MMOL/L — CRITICAL LOW (ref 3.5–5.3)
POTASSIUM SERPL-SCNC: 2.9 MMOL/L — CRITICAL LOW (ref 3.5–5.3)
POTASSIUM SERPL-SCNC: 3.2 MMOL/L — LOW (ref 3.5–5.3)
PROT SERPL-MCNC: 5.6 G/DL — LOW (ref 6.4–8.2)
PROT SERPL-MCNC: 6.1 G/DL — LOW (ref 6.4–8.2)
PROTHROM AB SERPL-ACNC: 14.3 SEC — HIGH (ref 10–13.1)
PROTHROM AB SERPL-ACNC: 14.3 SEC — HIGH (ref 10–13.1)
RBC # BLD: 3.36 M/UL — LOW (ref 4.2–5.8)
RBC # BLD: 3.37 M/UL — LOW (ref 4.2–5.8)
RBC # BLD: 3.46 M/UL — LOW (ref 4.2–5.8)
RBC # FLD: 14.4 % — SIGNIFICANT CHANGE UP (ref 10.3–16.9)
RBC # FLD: 14.8 % — SIGNIFICANT CHANGE UP (ref 10.3–16.9)
RBC # FLD: 14.9 % — SIGNIFICANT CHANGE UP (ref 10.3–16.9)
SAO2 % BLDV: 81 % — SIGNIFICANT CHANGE UP
SODIUM SERPL-SCNC: 141 MMOL/L — SIGNIFICANT CHANGE UP (ref 135–145)
SODIUM SERPL-SCNC: 141 MMOL/L — SIGNIFICANT CHANGE UP (ref 135–145)
SODIUM SERPL-SCNC: 142 MMOL/L — SIGNIFICANT CHANGE UP (ref 135–145)
SODIUM SERPL-SCNC: 143 MMOL/L — SIGNIFICANT CHANGE UP (ref 135–145)
WBC # BLD: 5.2 K/UL — SIGNIFICANT CHANGE UP (ref 3.8–10.5)
WBC # BLD: 5.6 K/UL — SIGNIFICANT CHANGE UP (ref 3.8–10.5)
WBC # BLD: 5.6 K/UL — SIGNIFICANT CHANGE UP (ref 3.8–10.5)
WBC # FLD AUTO: 5.2 K/UL — SIGNIFICANT CHANGE UP (ref 3.8–10.5)
WBC # FLD AUTO: 5.6 K/UL — SIGNIFICANT CHANGE UP (ref 3.8–10.5)
WBC # FLD AUTO: 5.6 K/UL — SIGNIFICANT CHANGE UP (ref 3.8–10.5)

## 2017-01-19 PROCEDURE — 74000: CPT | Mod: 26,59

## 2017-01-19 PROCEDURE — 99232 SBSQ HOSP IP/OBS MODERATE 35: CPT

## 2017-01-19 PROCEDURE — 74020: CPT | Mod: 26

## 2017-01-19 PROCEDURE — 93010 ELECTROCARDIOGRAM REPORT: CPT

## 2017-01-19 PROCEDURE — 71010: CPT | Mod: 26

## 2017-01-19 RX ORDER — POTASSIUM CHLORIDE 20 MEQ
20 PACKET (EA) ORAL ONCE
Qty: 0 | Refills: 0 | Status: COMPLETED | OUTPATIENT
Start: 2017-01-19 | End: 2017-01-19

## 2017-01-19 RX ORDER — SOD SULF/SODIUM/NAHCO3/KCL/PEG
4000 SOLUTION, RECONSTITUTED, ORAL ORAL ONCE
Qty: 0 | Refills: 0 | Status: COMPLETED | OUTPATIENT
Start: 2017-01-19 | End: 2017-01-19

## 2017-01-19 RX ORDER — POTASSIUM CHLORIDE 20 MEQ
20 PACKET (EA) ORAL
Qty: 0 | Refills: 0 | Status: COMPLETED | OUTPATIENT
Start: 2017-01-19 | End: 2017-01-20

## 2017-01-19 RX ORDER — SODIUM CHLORIDE 9 MG/ML
1000 INJECTION, SOLUTION INTRAVENOUS ONCE
Qty: 0 | Refills: 0 | Status: COMPLETED | OUTPATIENT
Start: 2017-01-19 | End: 2017-01-19

## 2017-01-19 RX ORDER — ELECTROLYTE SOLUTION,INJ
1 VIAL (ML) INTRAVENOUS
Qty: 0 | Refills: 0 | Status: DISCONTINUED | OUTPATIENT
Start: 2017-01-19 | End: 2017-01-20

## 2017-01-19 RX ORDER — POTASSIUM CHLORIDE 20 MEQ
20 PACKET (EA) ORAL
Qty: 0 | Refills: 0 | Status: COMPLETED | OUTPATIENT
Start: 2017-01-19 | End: 2017-01-19

## 2017-01-19 RX ORDER — INSULIN LISPRO 100/ML
VIAL (ML) SUBCUTANEOUS EVERY 6 HOURS
Qty: 0 | Refills: 0 | Status: DISCONTINUED | OUTPATIENT
Start: 2017-01-19 | End: 2017-01-22

## 2017-01-19 RX ORDER — POTASSIUM CHLORIDE 20 MEQ
20 PACKET (EA) ORAL
Qty: 0 | Refills: 0 | Status: COMPLETED | OUTPATIENT
Start: 2017-01-20 | End: 2017-01-20

## 2017-01-19 RX ORDER — MAGNESIUM SULFATE 500 MG/ML
4 VIAL (ML) INJECTION ONCE
Qty: 0 | Refills: 0 | Status: COMPLETED | OUTPATIENT
Start: 2017-01-19 | End: 2017-01-19

## 2017-01-19 RX ADMIN — Medication 100 MILLIEQUIVALENT(S): at 02:09

## 2017-01-19 RX ADMIN — SODIUM CHLORIDE 500 MILLILITER(S): 9 INJECTION, SOLUTION INTRAVENOUS at 22:49

## 2017-01-19 RX ADMIN — Medication 2.5 MILLIGRAM(S): at 06:53

## 2017-01-19 RX ADMIN — SODIUM CHLORIDE 3 MILLILITER(S): 9 INJECTION INTRAMUSCULAR; INTRAVENOUS; SUBCUTANEOUS at 14:32

## 2017-01-19 RX ADMIN — Medication 100 MILLIEQUIVALENT(S): at 23:52

## 2017-01-19 RX ADMIN — Medication 2.5 MILLIGRAM(S): at 17:48

## 2017-01-19 RX ADMIN — Medication 100 MILLIEQUIVALENT(S): at 05:51

## 2017-01-19 RX ADMIN — HEPARIN SODIUM 7500 UNIT(S): 5000 INJECTION INTRAVENOUS; SUBCUTANEOUS at 06:53

## 2017-01-19 RX ADMIN — HEPARIN SODIUM 7500 UNIT(S): 5000 INJECTION INTRAVENOUS; SUBCUTANEOUS at 22:30

## 2017-01-19 RX ADMIN — Medication 75 EACH: at 17:49

## 2017-01-19 RX ADMIN — HEPARIN SODIUM 7500 UNIT(S): 5000 INJECTION INTRAVENOUS; SUBCUTANEOUS at 15:11

## 2017-01-19 RX ADMIN — PANTOPRAZOLE SODIUM 40 MILLIGRAM(S): 20 TABLET, DELAYED RELEASE ORAL at 12:10

## 2017-01-19 RX ADMIN — AMIODARONE HYDROCHLORIDE 200 MILLIGRAM(S): 400 TABLET ORAL at 06:53

## 2017-01-19 RX ADMIN — SODIUM CHLORIDE 3 MILLILITER(S): 9 INJECTION INTRAMUSCULAR; INTRAVENOUS; SUBCUTANEOUS at 22:31

## 2017-01-19 RX ADMIN — Medication 100 MILLIEQUIVALENT(S): at 00:36

## 2017-01-19 RX ADMIN — Medication 100 MILLIEQUIVALENT(S): at 12:09

## 2017-01-19 RX ADMIN — Medication 2.5 MILLIGRAM(S): at 12:10

## 2017-01-19 RX ADMIN — Medication 0.5 MILLIGRAM(S): at 07:14

## 2017-01-19 RX ADMIN — Medication 100 GRAM(S): at 08:30

## 2017-01-19 RX ADMIN — Medication 100 MILLIEQUIVALENT(S): at 10:50

## 2017-01-19 RX ADMIN — Medication 2.5 MILLIGRAM(S): at 00:46

## 2017-01-19 RX ADMIN — Medication 100 MILLIEQUIVALENT(S): at 22:31

## 2017-01-19 RX ADMIN — Medication 4000 MILLILITER(S): at 23:02

## 2017-01-19 RX ADMIN — Medication 100 MILLIEQUIVALENT(S): at 03:24

## 2017-01-19 RX ADMIN — SODIUM CHLORIDE 3 MILLILITER(S): 9 INJECTION INTRAMUSCULAR; INTRAVENOUS; SUBCUTANEOUS at 05:49

## 2017-01-19 RX ADMIN — Medication 100 MILLIEQUIVALENT(S): at 14:31

## 2017-01-19 NOTE — PROGRESS NOTE ADULT - SUBJECTIVE AND OBJECTIVE BOX
ALLEGRA CEDILLO  [id number]  76y  Male  codeine (Unknown)    Patient seen and examined at bedside. Patient reports passing BMs and flatus yesterday; denies abdominal pain, does not feel bloated and denies nausea or vomiting.     T(C): 36.1, Max: 37.1 (01-18 @ 14:00)  HR: 103 (77 - 118)  BP: 97/58 (82/60 - 163/83)  RR: 22 (12 - 24)  SpO2: 97% (84% - 98%)  Wt(kg): --    I & Os for 24h ending 01-18 @ 07:00  =============================================  IN: 4590 ml / OUT: 1250 ml / NET: 3340 ml    I & Os for current day (as of 01-19 @ 06:21)  =============================================  IN: 2925 ml / OUT: 2925 ml / NET: 0 ml      PE:  Gen: NAD  Chest: incision c/d/i  Abd: Soft, minimally distended, no tenderness in any quadrant, no rebound no rigidity                        8.9    5.2   )-----------( 266      ( 19 Jan 2017 03:26 )             27.0       19 Jan 2017 03:26    141    |  109    |  16     ----------------------------<  95     3.2     |  21     |  0.91     Ca    8.2        19 Jan 2017 03:26  Phos  3.5       19 Jan 2017 03:26  Mg     1.7       19 Jan 2017 03:26    TPro  5.6    /  Alb  2.5    /  TBili  0.8    /  DBili  0.10   /  AST  41     /  ALT  32     /  AlkPhos  53     18 Jan 2017 03:30          aspirin enteric coated 81milliGRAM(s) Oral daily  buDESOnide   0.5 milliGRAM(s) Respule 0.5milliGRAM(s) Inhalation every 12 hours  FLUoxetine 40milliGRAM(s) Oral daily  heparin  Injectable 7500Unit(s) SubCutaneous every 8 hours  sodium chloride 0.9% lock flush 3milliLiter(s) IV Push every 8 hours  atorvastatin 40milliGRAM(s) Oral at bedtime  lactated ringers. 1000milliLiter(s) IV Continuous <Continuous>  metoprolol Injectable 2.5milliGRAM(s) IV Push every 6 hours  pantoprazole  Injectable 40milliGRAM(s) IV Push daily  amiodarone IVPB 150milliGRAM(s) IV Intermittent daily  acetaminophen  IVPB. 1000milliGRAM(s) IV Intermittent once      I71.2  No h/o HF  Unknown h/o HF  Handoff  MEWS Score  Prostate cancer  CVA (cerebral vascular accident)  Prostate cancer  Cervical stenosis of spine  Hiatal hernia  Temporal arteritis  Obstructive sleep apnea  Hyperlipidemia  Hypertension  Asthma  No pertinent past medical history  Ascending aortic aneurysm  Ascending aortic aneurysm  History of tonsillectomy  H/O: knee surgery  H/O prostatectomy  S/P CABG (coronary artery bypass graft)

## 2017-01-19 NOTE — PROGRESS NOTE ADULT - SUBJECTIVE AND OBJECTIVE BOX
Pt seen and examined. Patient continues to feel well, had more bm and gas passed after enema yesterday.  No n/v, abdomen much softer per patient    REVIEW OF SYSTEMS:  Constitutional: No fever, weight loss or fatigue  Cardiovascular: No chest pain, palpitations, dizziness or leg swelling  Gastrointestinal: No abdominal or epigastric pain. No nausea, vomiting or hematemesis; No diarrhea or constipation. No melena or hematochezia.  Skin: No itching, burning, rashes or lesions       MEDICATIONS:  MEDICATIONS  (STANDING):  aspirin enteric coated 81milliGRAM(s) Oral daily  buDESOnide   0.5 milliGRAM(s) Respule 0.5milliGRAM(s) Inhalation every 12 hours  heparin  Injectable 7500Unit(s) SubCutaneous every 8 hours  sodium chloride 0.9% lock flush 3milliLiter(s) IV Push every 8 hours  lactated ringers. 1000milliLiter(s) IV Continuous <Continuous>  metoprolol Injectable 2.5milliGRAM(s) IV Push every 6 hours  pantoprazole  Injectable 40milliGRAM(s) IV Push daily  amiodarone IVPB 150milliGRAM(s) IV Intermittent daily  acetaminophen  IVPB. 1000milliGRAM(s) IV Intermittent once  Parenteral Nutrition - Adult 1Each TPN Continuous <Continuous>  insulin lispro (HumaLOG) corrective regimen sliding scale  SubCutaneous every 6 hours    MEDICATIONS  (PRN):      Allergies    codeine (Unknown)    Intolerances        Vital Signs Last 24 Hrs  T(C): 36.4, Max: 36.9 (01-18 @ 20:20)  T(F): 97.5, Max: 98.4 (01-18 @ 20:20)  HR: 80 (73 - 107)  BP: 113/60 (92/66 - 119/71)  BP(mean): 82 (56 - 106)  RR: 20 (16 - 24)  SpO2: 99% (93% - 99%)  I & Os for 24h ending 01-19 @ 07:00  =============================================  IN: 3100 ml / OUT: 2925 ml / NET: 175 ml    I & Os for current day (as of 01-19 @ 16:01)  =============================================  IN: 750 ml / OUT: 300 ml / NET: 450 ml      PHYSICAL EXAM:    General: Well developed; well nourished; in no acute distress  HEENT: MMM, conjunctiva and sclera clear  Gastrointestinal: Soft with persistent gas in abdomen, improved from yesterday. No tenderness. Normal bowel sounds; No hepatosplenomegaly. No rebound or guarding  Skin: Warm and dry. No obvious rash    LABS:  CBC Full  -  ( 19 Jan 2017 03:26 )  WBC Count : 5.2 K/uL  Hemoglobin : 8.9 g/dL  Hematocrit : 27.0 %  Platelet Count - Automated : 266 K/uL  Mean Cell Volume : 80.4 fL  Mean Cell Hemoglobin : 26.5 pg  Mean Cell Hemoglobin Concentration : 33.0 g/dL  Auto Neutrophil # : x  Auto Lymphocyte # : x  Auto Monocyte # : x  Auto Eosinophil # : x  Auto Basophil # : x  Auto Neutrophil % : x  Auto Lymphocyte % : x  Auto Monocyte % : x  Auto Eosinophil % : x  Auto Basophil % : x    19 Jan 2017 09:59    143    |  110    |  18     ----------------------------<  107    2.9     |  18     |  0.74     Ca    7.8        19 Jan 2017 09:59  Phos  3.5       19 Jan 2017 03:26  Mg     3.3       19 Jan 2017 09:59    TPro  5.6    /  Alb  2.7    /  TBili  0.6    /  DBili  x      /  AST  23     /  ALT  38     /  AlkPhos  57     19 Jan 2017 09:59    PT/INR - ( 19 Jan 2017 03:26 )   PT: 14.3 sec;   INR: 1.29          PTT - ( 19 Jan 2017 03:26 )  PTT:33.2 sec          RADIOLOGY & ADDITIONAL STUDIES:  Overall gas pattern improved, but persistent cecal dilation (unofficial read)

## 2017-01-19 NOTE — PROGRESS NOTE ADULT - ASSESSMENT
76 year old male with history of CAD s/p CABG x4 in 2005, asthma, COPD, hypertension, hyperlipidemia, PAUL, Temporal arteritis, hiatal hernia, cervical and lumbar stenosis, prostate cancer s/p radial prostatectomy in 2003 (no radiation or chemo) who presents for elective repair of thoracic aortic aneurysm s/p ascending and hemiarch replacement. course complicated by ileus     #constipation - patient with gaseous abdominal distension, at one point impacting respiratory effort, worsening on POD5-7. Now improved after 1x dose of neostigmine and subsequent BM. Likely post op ileus.  Exam imroved last night, much softer, but still some persistent gaseous distension. AXR shows improved overall gas pattern, but persistent large cecal dilation (unofficially reviewed with radiologist).  -Would attempt another round of neostigmine in consideration of previous response.  -If unsatisfactory improvement, give golytely to facilitate motility as well as prep for possible colonoscopy if dilation persists  -enemas prn constipation/distension  -encourage ambulation and avoid opiate pain medications  -replete electrolytes PRN 76 year old male with history of CAD s/p CABG x4 in 2005, asthma, COPD, hypertension, hyperlipidemia, PAUL, Temporal arteritis, hiatal hernia, cervical and lumbar stenosis, prostate cancer s/p radial prostatectomy in 2003 (no radiation or chemo) who presents for elective repair of thoracic aortic aneurysm s/p ascending and hemiarch replacement. course complicated by ileus     #constipation - patient with gaseous abdominal distension, at one point impacting respiratory effort, worsening on POD5-7. Now improved after 1x dose of neostigmine and subsequent BM. Likely post op ileus.  Exam imroved last night, much softer, but still some persistent gaseous distension. AXR shows improved overall gas pattern, but persistent large cecal dilation (unofficially reviewed with radiologist).  -Repeat PM AXR, flat and upright if possible, if unsatisfactory improvement, give golytely to facilitate motility. If persistent dilation on AM AXR we will consider colonoscopic decompression  -if patient experiences n/v, abdominal pain and distension or is not having output to keep up with intake, initiate tap water enemas prn (avoid fleets in consideration of electrolyte abnormalities)  -encourage ambulation and avoid opiate pain medications  -replete electrolytes PRN  -clears today, NPO after midnight

## 2017-01-19 NOTE — PROGRESS NOTE ADULT - ASSESSMENT
76M s/p aortic arch replacement c/b postoperative colonic pseudo obstruction s/p neostigmine 2 nights ago, now passing flatus and bowel movements freely and symptomatically improved. No acute surgical intervention warranted at this time. If symptoms of abdominal distension and pain return, recommend repeat abdominal plain film. Please call with questions.

## 2017-01-20 LAB
ALBUMIN SERPL ELPH-MCNC: 2.6 G/DL — LOW (ref 3.4–5)
ALBUMIN SERPL ELPH-MCNC: 2.7 G/DL — LOW (ref 3.4–5)
ALP SERPL-CCNC: 56 U/L — SIGNIFICANT CHANGE UP (ref 40–120)
ALP SERPL-CCNC: 56 U/L — SIGNIFICANT CHANGE UP (ref 40–120)
ALT FLD-CCNC: 37 U/L — SIGNIFICANT CHANGE UP (ref 12–42)
ALT FLD-CCNC: 38 U/L — SIGNIFICANT CHANGE UP (ref 12–42)
ANION GAP SERPL CALC-SCNC: 11 MMOL/L — SIGNIFICANT CHANGE UP (ref 9–16)
ANION GAP SERPL CALC-SCNC: 8 MMOL/L — LOW (ref 9–16)
ANION GAP SERPL CALC-SCNC: 9 MMOL/L — SIGNIFICANT CHANGE UP (ref 9–16)
ANION GAP SERPL CALC-SCNC: 9 MMOL/L — SIGNIFICANT CHANGE UP (ref 9–16)
APTT BLD: 31.6 SEC — SIGNIFICANT CHANGE UP (ref 27.5–37.4)
AST SERPL-CCNC: 22 U/L — SIGNIFICANT CHANGE UP (ref 15–37)
AST SERPL-CCNC: 25 U/L — SIGNIFICANT CHANGE UP (ref 15–37)
BASE EXCESS BLDV CALC-SCNC: -2.1 MMOL/L — SIGNIFICANT CHANGE UP
BILIRUB SERPL-MCNC: 0.4 MG/DL — SIGNIFICANT CHANGE UP (ref 0.2–1.2)
BILIRUB SERPL-MCNC: 0.5 MG/DL — SIGNIFICANT CHANGE UP (ref 0.2–1.2)
BUN SERPL-MCNC: 15 MG/DL — SIGNIFICANT CHANGE UP (ref 7–23)
BUN SERPL-MCNC: 18 MG/DL — SIGNIFICANT CHANGE UP (ref 7–23)
BUN SERPL-MCNC: 19 MG/DL — SIGNIFICANT CHANGE UP (ref 7–23)
BUN SERPL-MCNC: 19 MG/DL — SIGNIFICANT CHANGE UP (ref 7–23)
CALCIUM SERPL-MCNC: 7.4 MG/DL — LOW (ref 8.5–10.5)
CALCIUM SERPL-MCNC: 7.4 MG/DL — LOW (ref 8.5–10.5)
CALCIUM SERPL-MCNC: 7.6 MG/DL — LOW (ref 8.5–10.5)
CALCIUM SERPL-MCNC: 7.7 MG/DL — LOW (ref 8.5–10.5)
CHLORIDE SERPL-SCNC: 106 MMOL/L — SIGNIFICANT CHANGE UP (ref 96–108)
CHLORIDE SERPL-SCNC: 108 MMOL/L — SIGNIFICANT CHANGE UP (ref 96–108)
CHLORIDE SERPL-SCNC: 108 MMOL/L — SIGNIFICANT CHANGE UP (ref 96–108)
CHLORIDE SERPL-SCNC: 109 MMOL/L — HIGH (ref 96–108)
CO2 SERPL-SCNC: 22 MMOL/L — SIGNIFICANT CHANGE UP (ref 22–31)
CO2 SERPL-SCNC: 22 MMOL/L — SIGNIFICANT CHANGE UP (ref 22–31)
CO2 SERPL-SCNC: 23 MMOL/L — SIGNIFICANT CHANGE UP (ref 22–31)
CO2 SERPL-SCNC: 24 MMOL/L — SIGNIFICANT CHANGE UP (ref 22–31)
CREAT SERPL-MCNC: 0.65 MG/DL — SIGNIFICANT CHANGE UP (ref 0.5–1.3)
CREAT SERPL-MCNC: 0.66 MG/DL — SIGNIFICANT CHANGE UP (ref 0.5–1.3)
CREAT SERPL-MCNC: 0.69 MG/DL — SIGNIFICANT CHANGE UP (ref 0.5–1.3)
CREAT SERPL-MCNC: 0.78 MG/DL — SIGNIFICANT CHANGE UP (ref 0.5–1.3)
GAS PNL BLDV: SIGNIFICANT CHANGE UP
GLUCOSE SERPL-MCNC: 122 MG/DL — HIGH (ref 70–99)
GLUCOSE SERPL-MCNC: 125 MG/DL — HIGH (ref 70–99)
GLUCOSE SERPL-MCNC: 130 MG/DL — HIGH (ref 70–99)
GLUCOSE SERPL-MCNC: 137 MG/DL — HIGH (ref 70–99)
HCO3 BLDV-SCNC: 22 MMOL/L — SIGNIFICANT CHANGE UP (ref 20–27)
HCT VFR BLD CALC: 24.3 % — LOW (ref 39–50)
HCT VFR BLD CALC: 25.1 % — LOW (ref 39–50)
HCT VFR BLD CALC: 25.3 % — LOW (ref 39–50)
HCT VFR BLD CALC: 26.3 % — LOW (ref 39–50)
HGB BLD-MCNC: 8.1 G/DL — LOW (ref 13–17)
HGB BLD-MCNC: 8.4 G/DL — LOW (ref 13–17)
HGB BLD-MCNC: 8.4 G/DL — LOW (ref 13–17)
HGB BLD-MCNC: 8.6 G/DL — LOW (ref 13–17)
INR BLD: 1.28 — HIGH (ref 0.88–1.16)
INR BLD: 1.35 — HIGH (ref 0.88–1.16)
LACTATE SERPL-SCNC: 0.9 MMOL/L — SIGNIFICANT CHANGE UP (ref 0.5–2)
MAGNESIUM SERPL-MCNC: 2 MG/DL — SIGNIFICANT CHANGE UP (ref 1.6–2.4)
MAGNESIUM SERPL-MCNC: 2 MG/DL — SIGNIFICANT CHANGE UP (ref 1.6–2.4)
MAGNESIUM SERPL-MCNC: 2.2 MG/DL — SIGNIFICANT CHANGE UP (ref 1.6–2.4)
MAGNESIUM SERPL-MCNC: 2.2 MG/DL — SIGNIFICANT CHANGE UP (ref 1.6–2.4)
MCHC RBC-ENTMCNC: 26.1 PG — LOW (ref 27–34)
MCHC RBC-ENTMCNC: 26.6 PG — LOW (ref 27–34)
MCHC RBC-ENTMCNC: 26.6 PG — LOW (ref 27–34)
MCHC RBC-ENTMCNC: 26.7 PG — LOW (ref 27–34)
MCHC RBC-ENTMCNC: 32.7 G/DL — SIGNIFICANT CHANGE UP (ref 32–36)
MCHC RBC-ENTMCNC: 33.2 G/DL — SIGNIFICANT CHANGE UP (ref 32–36)
MCHC RBC-ENTMCNC: 33.3 G/DL — SIGNIFICANT CHANGE UP (ref 32–36)
MCHC RBC-ENTMCNC: 33.5 G/DL — SIGNIFICANT CHANGE UP (ref 32–36)
MCV RBC AUTO: 79.7 FL — LOW (ref 80–100)
MCV RBC AUTO: 79.7 FL — LOW (ref 80–100)
MCV RBC AUTO: 79.9 FL — LOW (ref 80–100)
MCV RBC AUTO: 80.1 FL — SIGNIFICANT CHANGE UP (ref 80–100)
PCO2 BLDV: 36 MMHG — LOW (ref 41–51)
PH BLDV: 7.41 — SIGNIFICANT CHANGE UP (ref 7.32–7.43)
PHOSPHATE SERPL-MCNC: 2.6 MG/DL — SIGNIFICANT CHANGE UP (ref 2.5–4.5)
PHOSPHATE SERPL-MCNC: 3 MG/DL — SIGNIFICANT CHANGE UP (ref 2.5–4.5)
PLATELET # BLD AUTO: 277 K/UL — SIGNIFICANT CHANGE UP (ref 150–400)
PLATELET # BLD AUTO: 280 K/UL — SIGNIFICANT CHANGE UP (ref 150–400)
PLATELET # BLD AUTO: 293 K/UL — SIGNIFICANT CHANGE UP (ref 150–400)
PLATELET # BLD AUTO: 298 K/UL — SIGNIFICANT CHANGE UP (ref 150–400)
PO2 BLDV: 34 MMHG — SIGNIFICANT CHANGE UP
POTASSIUM SERPL-MCNC: 3.2 MMOL/L — LOW (ref 3.5–5.3)
POTASSIUM SERPL-MCNC: 3.5 MMOL/L — SIGNIFICANT CHANGE UP (ref 3.5–5.3)
POTASSIUM SERPL-MCNC: 3.5 MMOL/L — SIGNIFICANT CHANGE UP (ref 3.5–5.3)
POTASSIUM SERPL-MCNC: 3.7 MMOL/L — SIGNIFICANT CHANGE UP (ref 3.5–5.3)
POTASSIUM SERPL-SCNC: 3.2 MMOL/L — LOW (ref 3.5–5.3)
POTASSIUM SERPL-SCNC: 3.5 MMOL/L — SIGNIFICANT CHANGE UP (ref 3.5–5.3)
POTASSIUM SERPL-SCNC: 3.5 MMOL/L — SIGNIFICANT CHANGE UP (ref 3.5–5.3)
POTASSIUM SERPL-SCNC: 3.7 MMOL/L — SIGNIFICANT CHANGE UP (ref 3.5–5.3)
PROT SERPL-MCNC: 5.6 G/DL — LOW (ref 6.4–8.2)
PROT SERPL-MCNC: 5.7 G/DL — LOW (ref 6.4–8.2)
PROTHROM AB SERPL-ACNC: 14.2 SEC — HIGH (ref 10–13.1)
PROTHROM AB SERPL-ACNC: 15 SEC — HIGH (ref 10–13.1)
RBC # BLD: 3.04 M/UL — LOW (ref 4.2–5.8)
RBC # BLD: 3.15 M/UL — LOW (ref 4.2–5.8)
RBC # BLD: 3.16 M/UL — LOW (ref 4.2–5.8)
RBC # BLD: 3.3 M/UL — LOW (ref 4.2–5.8)
RBC # FLD: 14.1 % — SIGNIFICANT CHANGE UP (ref 10.3–16.9)
RBC # FLD: 14.2 % — SIGNIFICANT CHANGE UP (ref 10.3–16.9)
RBC # FLD: 14.3 % — SIGNIFICANT CHANGE UP (ref 10.3–16.9)
RBC # FLD: 14.6 % — SIGNIFICANT CHANGE UP (ref 10.3–16.9)
SAO2 % BLDV: 66 % — SIGNIFICANT CHANGE UP
SODIUM SERPL-SCNC: 138 MMOL/L — SIGNIFICANT CHANGE UP (ref 135–145)
SODIUM SERPL-SCNC: 139 MMOL/L — SIGNIFICANT CHANGE UP (ref 135–145)
SODIUM SERPL-SCNC: 141 MMOL/L — SIGNIFICANT CHANGE UP (ref 135–145)
SODIUM SERPL-SCNC: 141 MMOL/L — SIGNIFICANT CHANGE UP (ref 135–145)
WBC # BLD: 5 K/UL — SIGNIFICANT CHANGE UP (ref 3.8–10.5)
WBC # BLD: 5.1 K/UL — SIGNIFICANT CHANGE UP (ref 3.8–10.5)
WBC # BLD: 5.2 K/UL — SIGNIFICANT CHANGE UP (ref 3.8–10.5)
WBC # BLD: 5.9 K/UL — SIGNIFICANT CHANGE UP (ref 3.8–10.5)
WBC # FLD AUTO: 5 K/UL — SIGNIFICANT CHANGE UP (ref 3.8–10.5)
WBC # FLD AUTO: 5.1 K/UL — SIGNIFICANT CHANGE UP (ref 3.8–10.5)
WBC # FLD AUTO: 5.2 K/UL — SIGNIFICANT CHANGE UP (ref 3.8–10.5)
WBC # FLD AUTO: 5.9 K/UL — SIGNIFICANT CHANGE UP (ref 3.8–10.5)

## 2017-01-20 PROCEDURE — 74000: CPT | Mod: 26

## 2017-01-20 PROCEDURE — 93010 ELECTROCARDIOGRAM REPORT: CPT

## 2017-01-20 PROCEDURE — 99233 SBSQ HOSP IP/OBS HIGH 50: CPT

## 2017-01-20 PROCEDURE — 71010: CPT | Mod: 26

## 2017-01-20 RX ORDER — MAGNESIUM SULFATE 500 MG/ML
2 VIAL (ML) INJECTION ONCE
Qty: 0 | Refills: 0 | Status: COMPLETED | OUTPATIENT
Start: 2017-01-20 | End: 2017-01-20

## 2017-01-20 RX ORDER — POLYETHYLENE GLYCOL 3350 17 G/17G
17 POWDER, FOR SOLUTION ORAL DAILY
Qty: 0 | Refills: 0 | Status: DISCONTINUED | OUTPATIENT
Start: 2017-01-20 | End: 2017-01-22

## 2017-01-20 RX ORDER — I.V. FAT EMULSION 20 G/100ML
50 EMULSION INTRAVENOUS
Qty: 0 | Refills: 0 | Status: DISCONTINUED | OUTPATIENT
Start: 2017-01-20 | End: 2017-01-20

## 2017-01-20 RX ORDER — ELECTROLYTE SOLUTION,INJ
1 VIAL (ML) INTRAVENOUS
Qty: 0 | Refills: 0 | Status: DISCONTINUED | OUTPATIENT
Start: 2017-01-20 | End: 2017-01-20

## 2017-01-20 RX ORDER — POTASSIUM CHLORIDE 20 MEQ
20 PACKET (EA) ORAL ONCE
Qty: 0 | Refills: 0 | Status: COMPLETED | OUTPATIENT
Start: 2017-01-20 | End: 2017-01-20

## 2017-01-20 RX ORDER — POTASSIUM CHLORIDE 20 MEQ
20 PACKET (EA) ORAL
Qty: 0 | Refills: 0 | Status: COMPLETED | OUTPATIENT
Start: 2017-01-20 | End: 2017-01-20

## 2017-01-20 RX ORDER — METOPROLOL TARTRATE 50 MG
5 TABLET ORAL EVERY 8 HOURS
Qty: 0 | Refills: 0 | Status: DISCONTINUED | OUTPATIENT
Start: 2017-01-20 | End: 2017-01-22

## 2017-01-20 RX ADMIN — HEPARIN SODIUM 7500 UNIT(S): 5000 INJECTION INTRAVENOUS; SUBCUTANEOUS at 12:59

## 2017-01-20 RX ADMIN — Medication 100 MILLIEQUIVALENT(S): at 03:07

## 2017-01-20 RX ADMIN — Medication 5 MILLIGRAM(S): at 22:34

## 2017-01-20 RX ADMIN — SODIUM CHLORIDE 3 MILLILITER(S): 9 INJECTION INTRAMUSCULAR; INTRAVENOUS; SUBCUTANEOUS at 06:00

## 2017-01-20 RX ADMIN — HEPARIN SODIUM 7500 UNIT(S): 5000 INJECTION INTRAVENOUS; SUBCUTANEOUS at 22:31

## 2017-01-20 RX ADMIN — Medication 2.5 MILLIGRAM(S): at 06:02

## 2017-01-20 RX ADMIN — Medication 100 MILLIEQUIVALENT(S): at 01:33

## 2017-01-20 RX ADMIN — Medication 0.5 MILLIGRAM(S): at 19:15

## 2017-01-20 RX ADMIN — Medication 50 MILLIEQUIVALENT(S): at 08:01

## 2017-01-20 RX ADMIN — Medication 50 GRAM(S): at 17:05

## 2017-01-20 RX ADMIN — Medication 100 MILLIEQUIVALENT(S): at 04:26

## 2017-01-20 RX ADMIN — Medication 100 MILLIEQUIVALENT(S): at 18:10

## 2017-01-20 RX ADMIN — Medication 50 MILLIEQUIVALENT(S): at 05:55

## 2017-01-20 RX ADMIN — SODIUM CHLORIDE 3 MILLILITER(S): 9 INJECTION INTRAMUSCULAR; INTRAVENOUS; SUBCUTANEOUS at 22:35

## 2017-01-20 RX ADMIN — Medication 75 EACH: at 20:25

## 2017-01-20 RX ADMIN — Medication 100 MILLIEQUIVALENT(S): at 17:49

## 2017-01-20 RX ADMIN — Medication 0.5 MILLIGRAM(S): at 06:03

## 2017-01-20 RX ADMIN — SODIUM CHLORIDE 3 MILLILITER(S): 9 INJECTION INTRAMUSCULAR; INTRAVENOUS; SUBCUTANEOUS at 12:59

## 2017-01-20 RX ADMIN — AMIODARONE HYDROCHLORIDE 200 MILLIGRAM(S): 400 TABLET ORAL at 07:01

## 2017-01-20 RX ADMIN — PANTOPRAZOLE SODIUM 40 MILLIGRAM(S): 20 TABLET, DELAYED RELEASE ORAL at 12:59

## 2017-01-20 RX ADMIN — Medication 100 MILLIEQUIVALENT(S): at 16:53

## 2017-01-20 RX ADMIN — Medication 2.5 MILLIGRAM(S): at 12:59

## 2017-01-20 RX ADMIN — Medication 2.5 MILLIGRAM(S): at 00:01

## 2017-01-20 RX ADMIN — Medication 100 MILLIEQUIVALENT(S): at 12:58

## 2017-01-20 RX ADMIN — I.V. FAT EMULSION 20.83 GRAM(S): 20 EMULSION INTRAVENOUS at 20:25

## 2017-01-20 RX ADMIN — HEPARIN SODIUM 7500 UNIT(S): 5000 INJECTION INTRAVENOUS; SUBCUTANEOUS at 06:04

## 2017-01-20 RX ADMIN — Medication 50 MILLIEQUIVALENT(S): at 09:40

## 2017-01-20 RX ADMIN — Medication 100 MILLIEQUIVALENT(S): at 04:00

## 2017-01-20 NOTE — PROGRESS NOTE ADULT - SUBJECTIVE AND OBJECTIVE BOX
CTICU Note    Patient discussed on morning rounds with Dr. Suarez and Dr. Villalba     Operation / Date: Ascending aortic and hemiarch replacement with reimplantation of coronaries, EF 60% on 1/10/17    SUBJECTIVE ASSESSMENT:  76y Male POD#10 from ascending aortic and hemiarch replacement with reimplantation of coronaries. post-op c/b AF on amio load and ileus. GI has been following for ileus. Pt reports improvement of abd distention, and had multiple BMs overnight and throughout the day s/p go lytely overnight.     Vital Signs Last 24 Hrs  T(C): 36.6, Max: 36.7 (01-20 @ 10:00)  T(F): 97.9, Max: 98 (01-20 @ 10:00)  HR: 80 (72 - 106)  BP: 110/55 (88/34 - 142/65)  BP(mean): 84 (61 - 105)  RR: 21 (16 - 21)  SpO2: 97% (95% - 100%)  I&O's Detail      CHEST TUBE:  No.   ELIANA DRAIN:  No.  EPICARDIAL WIRES: No.  TIE DOWNS: No.  TAY: No.    PHYSICAL EXAM:    General:   Neurological:  Cardiovascular:  Respiratory:  Gastrointestinal:  Extremities:  Vascular:  Incision Sites:    LABS:                        8.4    5.2   )-----------( 298      ( 20 Jan 2017 15:48 )             25.1       COUMADIN:  No.  PT/INR - ( 20 Jan 2017 02:45 )   PT: 14.2 sec;   INR: 1.28     PTT - ( 20 Jan 2017 02:45 )  PTT:31.6 sec      141    |  109    |  19     ----------------------------<  130    3.5     |  23     |  0.65     Ca    7.4        20 Jan 2017 15:48  Phos  2.6       20 Jan 2017 02:45  Mg     2.0       20 Jan 2017 15:48    TPro  5.6    /  Alb  2.6    /  TBili  0.4    /  DBili  x      /  AST  22     /  ALT  38     /  AlkPhos  56     20 Jan 2017 10:21      MEDICATIONS  (STANDING):  aspirin enteric coated 81milliGRAM(s) Oral daily  buDESOnide   0.5 milliGRAM(s) Respule 0.5milliGRAM(s) Inhalation every 12 hours  heparin  Injectable 7500Unit(s) SubCutaneous every 8 hours  sodium chloride 0.9% lock flush 3milliLiter(s) IV Push every 8 hours  lactated ringers. 1000milliLiter(s) IV Continuous <Continuous>  pantoprazole  Injectable 40milliGRAM(s) IV Push daily  amiodarone IVPB 150milliGRAM(s) IV Intermittent daily  acetaminophen  IVPB. 1000milliGRAM(s) IV Intermittent once  insulin lispro (HumaLOG) corrective regimen sliding scale  SubCutaneous every 6 hours  fat emulsion 20% Infusion 50Gram(s) IV Continuous <Continuous>  Parenteral Nutrition - Adult 1Each TPN Continuous <Continuous>  metoprolol Injectable 5milliGRAM(s) IV Push every 8 hours  polyethylene glycol 3350 17Gram(s) Oral daily      RADIOLOGY & ADDITIONAL TESTS:  abd XR: improvement of colon distention   CXR: grossly clear lungs       Assessment: 75 y/o M w/ PMH of CAD s/p CABG x4 in 2005, asthma, COPD, hypertension, hyperlipidemia, obstructive sleep apnea uses CPAP at night, Temporal arteritis, hiatal hernia, cervical and lumbar stenosis, prostate cancer s/p radial prostatectomy in 2003 who presented for elective operation for thoracic aortic aneurysm.  CT chest on 6/28/16 revealed an ascending aorta measuring 6.2 x 5.9cm, aortic arch 4.9cm descending aora 4 x3.6cm and abdominal aorta 4.2cm. He was suppose to undergo the operation in November 2016 but during his pre-operative cardiac catheterization he had a post procedural ischemic stroke. 1/10/17 ascending aortic and hemiarch replacement with reimplantation of coronaries. post-op c/b AF on amio load and ileus. GI has been following for ileus. Abd XR this AM improved gas pattern and pt was advanced by GI to full liquid diet.     A/P:  Neurovascular: No delirium. Pain well controlled with current regimen.  -PRN's.    Cardiovascular: Hemodynamically stable. HR controlled.  -BP. HR. EKG. TTE. Cardiac Panel. Lipid Panel. BNP.   -ASA. Plavix. BBlocker. Statin.      Respiratory: 02 Sat = 98% on RA.  -If on oxygen wean to RA from for O2 Sat > 93%.  -Encourage C+DB and Use of IS 10x / hr while awake.  -CXR.    GI: Stable.  -NPO after MN.  -PPX.  -PO Diet.    Renal / : continue to monitor daily labs   -Cr WNL, K and mg continue to   -Monitor renal function.  -Monitor I/O's.    Endocrine:    -A1c- 6  -continue RISS and monitor FS     Hematologic: continue to monitor H/H  -H/H stable at 8.4/25.1  -no obvious signs of bleeding     ID: continue to monitor   -Tempature.  -CBC WBC WNL at 5.2  -Observe for SIRS/Sepsis Syndrome.    Prophylaxis:  -DVT prophylaxis with 7500 SubQ Heparin q8h.  -SCD's    Disposition:  -ICU for frequent monitoring CTICU Note    Patient discussed on morning rounds with Dr. Suarez and Dr. Villalba     Operation / Date: Ascending aortic and hemiarch replacement with reimplantation of coronaries, EF 60% on 1/10/17    SUBJECTIVE ASSESSMENT:  76y Male POD#10 from ascending aortic and hemiarch replacement with reimplantation of coronaries. post-op c/b AF on amio load and ileus. GI has been following for ileus. Pt reports improvement of abd distention, and had multiple BMs overnight and throughout the day s/p go lytely overnight.     Vital Signs Last 24 Hrs  T(C): 36.6, Max: 36.7 (01-20 @ 10:00)  T(F): 97.9, Max: 98 (01-20 @ 10:00)  HR: 80 (72 - 106)  BP: 110/55 (88/34 - 142/65)  BP(mean): 84 (61 - 105)  RR: 21 (16 - 21)  SpO2: 97% (95% - 100%)  I&O's Detail      CHEST TUBE:  No.   ELIANA DRAIN:  No.  EPICARDIAL WIRES: No.  TIE DOWNS: No.  TAY: No.    PHYSICAL EXAM:    General: NAD, sitting comfortably in bed   Neurological: RIVER, ambulating without focal deficits  Cardiovascular: RRR, no m/r/g   Respiratory: CTA b/l   Gastrointestinal: +BSx4, NT, distention improved, soft   Extremities: WWP, no edema or calf tenderness b/l   Incision Sites: sternotomy CDI, no drainage or erythema     LABS:                        8.4    5.2   )-----------( 298      ( 20 Jan 2017 15:48 )             25.1       COUMADIN:  No.  PT/INR - ( 20 Jan 2017 02:45 )   PT: 14.2 sec;   INR: 1.28     PTT - ( 20 Jan 2017 02:45 )  PTT:31.6 sec      141    |  109    |  19     ----------------------------<  130    3.5     |  23     |  0.65     Ca    7.4        20 Jan 2017 15:48  Phos  2.6       20 Jan 2017 02:45  Mg     2.0       20 Jan 2017 15:48    TPro  5.6    /  Alb  2.6    /  TBili  0.4    /  DBili  x      /  AST  22     /  ALT  38     /  AlkPhos  56     20 Jan 2017 10:21      MEDICATIONS  (STANDING):  aspirin enteric coated 81milliGRAM(s) Oral daily  buDESOnide   0.5 milliGRAM(s) Respule 0.5milliGRAM(s) Inhalation every 12 hours  heparin  Injectable 7500Unit(s) SubCutaneous every 8 hours  sodium chloride 0.9% lock flush 3milliLiter(s) IV Push every 8 hours  lactated ringers. 1000milliLiter(s) IV Continuous <Continuous>  pantoprazole  Injectable 40milliGRAM(s) IV Push daily  amiodarone IVPB 150milliGRAM(s) IV Intermittent daily  acetaminophen  IVPB. 1000milliGRAM(s) IV Intermittent once  insulin lispro (HumaLOG) corrective regimen sliding scale  SubCutaneous every 6 hours  fat emulsion 20% Infusion 50Gram(s) IV Continuous <Continuous>  Parenteral Nutrition - Adult 1Each TPN Continuous <Continuous>  metoprolol Injectable 5milliGRAM(s) IV Push every 8 hours  polyethylene glycol 3350 17Gram(s) Oral daily      RADIOLOGY & ADDITIONAL TESTS:  abd XR: improvement of colon distention   CXR: grossly clear lungs       Assessment: 75 y/o M w/ PMH of CAD s/p CABG x4 in 2005, asthma, COPD, hypertension, hyperlipidemia, obstructive sleep apnea uses CPAP at night, Temporal arteritis, hiatal hernia, cervical and lumbar stenosis, prostate cancer s/p radial prostatectomy in 2003 who presented for elective operation for thoracic aortic aneurysm.  CT chest on 6/28/16 revealed an ascending aorta measuring 6.2 x 5.9cm, aortic arch 4.9cm descending aora 4 x3.6cm and abdominal aorta 4.2cm. He was suppose to undergo the operation in November 2016 but during his pre-operative cardiac catheterization he had a post procedural ischemic stroke. 1/10/17 ascending aortic and hemiarch replacement with reimplantation of coronaries. post-op c/b AF on amio load and ileus. GI has been following for ileus. Abd XR this AM improved gas pattern and pt was advanced by GI to full liquid diet.     A/P:  Neurovascular: No delirium.   -avoiding narcotics d/t ileus     Cardiovascular: Hemodynamically stable. HR controlled. intermittent AF   -continue to monitor BP/HR/Tele  -increased lopressor to 5mg IV q8h and continue amiodarone 150mg IV bolus daily for post-op AF   -if tolerating PO will transition to PO meds   -continue to replete lytes     Respiratory: no acute issues   -Encourage C+DB and Use of IS 10x / hr while awake.  -CXR- grossly clear     GI: post-op ileus, improvement on AM abd XR   -appreciate GI recs   -advanced to full liquid diet   -continue daily miralax   -continue ppx   -f/u abd XR     Renal / : continue to monitor daily labs   -Cr WNL, K and mg continue to   -Monitor renal function.  -Monitor I/O's.    Endocrine:    -A1c- 6  -continue RISS and monitor FS     Hematologic: continue to monitor H/H  -H/H stable at 8.4/25.1  -no obvious signs of bleeding     ID: continue to monitor   -Tempature.  -CBC WBC WNL at 5.2  -Observe for SIRS/Sepsis Syndrome.    Prophylaxis:  -DVT prophylaxis with 7500 SubQ Heparin q8h.  -SCD's    Disposition:  -ICU for frequent monitoring CTICU Note    Patient discussed on morning rounds with Dr. Suarez and Dr. Villalba     Operation / Date: Ascending aortic and hemiarch replacement with reimplantation of coronaries, EF 60% on 1/10/17    SUBJECTIVE ASSESSMENT:  76y Male POD#10 from ascending aortic and hemiarch replacement with reimplantation of coronaries. post-op c/b AF on amio load and ileus. GI has been following for ileus. Pt reports improvement of abd distention, and had multiple BMs overnight and throughout the day s/p go lytely overnight.     Vital Signs Last 24 Hrs  T(C): 36.6, Max: 36.7 (01-20 @ 10:00)  T(F): 97.9, Max: 98 (01-20 @ 10:00)  HR: 80 (72 - 106)  BP: 110/55 (88/34 - 142/65)  BP(mean): 84 (61 - 105)  RR: 21 (16 - 21)  SpO2: 97% (95% - 100%)  I&O's Detail      CHEST TUBE:  No.   ELIANA DRAIN:  No.  EPICARDIAL WIRES: No.  TIE DOWNS: No.  TAY: No.    PHYSICAL EXAM:    General: NAD, sitting comfortably in bed   Neurological: RIVER, ambulating without focal deficits  Cardiovascular: RRR, no m/r/g   Respiratory: CTA b/l   Gastrointestinal: +BSx4, NT, distention improved, soft   Extremities: WWP, no edema or calf tenderness b/l   Incision Sites: sternotomy CDI, no drainage or erythema        LABS:                        8.4    5.2   )-----------( 298      ( 20 Jan 2017 15:48 )             25.1       COUMADIN:  No.  PT/INR - ( 20 Jan 2017 02:45 )   PT: 14.2 sec;   INR: 1.28     PTT - ( 20 Jan 2017 02:45 )  PTT:31.6 sec      141    |  109    |  19     ----------------------------<  130    3.5     |  23     |  0.65     Ca    7.4        20 Jan 2017 15:48  Phos  2.6       20 Jan 2017 02:45  Mg     2.0       20 Jan 2017 15:48    TPro  5.6    /  Alb  2.6    /  TBili  0.4    /  DBili  x      /  AST  22     /  ALT  38     /  AlkPhos  56     20 Jan 2017 10:21      MEDICATIONS  (STANDING):  aspirin enteric coated 81milliGRAM(s) Oral daily  buDESOnide   0.5 milliGRAM(s) Respule 0.5milliGRAM(s) Inhalation every 12 hours  heparin  Injectable 7500Unit(s) SubCutaneous every 8 hours  sodium chloride 0.9% lock flush 3milliLiter(s) IV Push every 8 hours  lactated ringers. 1000milliLiter(s) IV Continuous <Continuous>  pantoprazole  Injectable 40milliGRAM(s) IV Push daily  amiodarone IVPB 150milliGRAM(s) IV Intermittent daily  acetaminophen  IVPB. 1000milliGRAM(s) IV Intermittent once  insulin lispro (HumaLOG) corrective regimen sliding scale  SubCutaneous every 6 hours  fat emulsion 20% Infusion 50Gram(s) IV Continuous <Continuous>  Parenteral Nutrition - Adult 1Each TPN Continuous <Continuous>  metoprolol Injectable 5milliGRAM(s) IV Push every 8 hours  polyethylene glycol 3350 17Gram(s) Oral daily      RADIOLOGY & ADDITIONAL TESTS:  abd XR: improvement of colon distention   CXR: grossly clear lungs       Assessment: 77 y/o M w/ PMH of CAD s/p CABG x4 in 2005, asthma, COPD, hypertension, hyperlipidemia, obstructive sleep apnea uses CPAP at night, Temporal arteritis, hiatal hernia, cervical and lumbar stenosis, prostate cancer s/p radial prostatectomy in 2003 who presented for elective operation for thoracic aortic aneurysm.  CT chest on 6/28/16 revealed an ascending aorta measuring 6.2 x 5.9cm, aortic arch 4.9cm descending aora 4 x3.6cm and abdominal aorta 4.2cm. He was suppose to undergo the operation in November 2016 but during his pre-operative cardiac catheterization he had a post procedural ischemic stroke. 1/10/17 ascending aortic and hemiarch replacement with reimplantation of coronaries. post-op c/b AF on amio load and ileus. GI has been following for ileus. Abd XR this AM improved gas pattern and pt was advanced by GI to full liquid diet.     A/P:  Neurovascular: No delirium.   -avoiding narcotics d/t ileus     Cardiovascular: Hemodynamically stable. HR controlled. intermittent AF   -continue to monitor BP/HR/Tele  -increased lopressor to 5mg IV q8h and continue amiodarone 150mg IV bolus daily for post-op AF   -if tolerating PO will transition to PO meds   -continue to replete lytes     Respiratory: no acute issues   -Encourage C+DB and Use of IS 10x / hr while awake.  -CXR- grossly clear     GI: post-op ileus, improvement on AM abd XR   -appreciate GI recs   -advanced to full liquid diet   -continue daily miralax   -continue ppx   -f/u abd XR     Renal / : continue to monitor daily labs   -Cr WNL, K and mg continue to   -Monitor renal function.  -Monitor I/O's.    Endocrine:    -A1c- 6  -continue RISS and monitor FS     Hematologic: continue to monitor H/H  -H/H stable at 8.4/25.1  -no obvious signs of bleeding     ID: continue to monitor   -Tempature.  -CBC WBC WNL at 5.2  -Observe for SIRS/Sepsis Syndrome.    Prophylaxis:  -DVT prophylaxis with 7500 SubQ Heparin q8h.  -SCD's    Disposition:  -ICU for frequent monitoring

## 2017-01-20 NOTE — PROGRESS NOTE ADULT - ASSESSMENT
76 year old male with history of CAD s/p CABG x4 in 2005, asthma, COPD, hypertension, hyperlipidemia, PAUL, Temporal arteritis, hiatal hernia, cervical and lumbar stenosis, prostate cancer s/p radial prostatectomy in 2003 (no radiation or chemo) who presents for elective repair of thoracic aortic aneurysm s/p ascending and hemiarch replacement. course complicated by ileus     #constipation - patient with gaseous abdominal distension, at one point impacting respiratory effort, worsening on POD5-7. Now improved after 1x dose of neostigmine and bowel regimen; Likely post op ileus.  AXR reviewed with radiologist - cecal gas resolved, some distension in the descending colon representing improvement of pseudo-obstrcution  Clinically well without signs of obstruction. no role for endoscopy at this time.   -cont daily miralax  -encourage ambulation and avoid opiate pain medications  -replete electrolytes PRN  -full liquid diet, advance as tolerated tomorrow  -please reconsult if necessary; we do not anticipate gas reaccumulation, but will be happy to assist if this occurs.

## 2017-01-20 NOTE — PROGRESS NOTE ADULT - ATTENDING COMMENTS
Patient was seen and examined with the GI fellow Dr. Jennings. I agree with the excellent assessment and plan as outlined above.  We will continue to follow along with you.    Thank you for this consult. Please do not hesitate to contact us with any further questions.

## 2017-01-21 LAB
ALBUMIN SERPL ELPH-MCNC: 2.5 G/DL — LOW (ref 3.4–5)
ALP SERPL-CCNC: 54 U/L — SIGNIFICANT CHANGE UP (ref 40–120)
ALT FLD-CCNC: 33 U/L — SIGNIFICANT CHANGE UP (ref 12–42)
ANION GAP SERPL CALC-SCNC: 10 MMOL/L — SIGNIFICANT CHANGE UP (ref 9–16)
ANION GAP SERPL CALC-SCNC: 9 MMOL/L — SIGNIFICANT CHANGE UP (ref 9–16)
APTT BLD: 31.1 SEC — SIGNIFICANT CHANGE UP (ref 27.5–37.4)
AST SERPL-CCNC: 15 U/L — SIGNIFICANT CHANGE UP (ref 15–37)
BILIRUB SERPL-MCNC: 0.6 MG/DL — SIGNIFICANT CHANGE UP (ref 0.2–1.2)
BUN SERPL-MCNC: 15 MG/DL — SIGNIFICANT CHANGE UP (ref 7–23)
BUN SERPL-MCNC: 18 MG/DL — SIGNIFICANT CHANGE UP (ref 7–23)
CALCIUM SERPL-MCNC: 7.7 MG/DL — LOW (ref 8.5–10.5)
CALCIUM SERPL-MCNC: 7.8 MG/DL — LOW (ref 8.5–10.5)
CHLORIDE SERPL-SCNC: 106 MMOL/L — SIGNIFICANT CHANGE UP (ref 96–108)
CHLORIDE SERPL-SCNC: 107 MMOL/L — SIGNIFICANT CHANGE UP (ref 96–108)
CO2 SERPL-SCNC: 22 MMOL/L — SIGNIFICANT CHANGE UP (ref 22–31)
CO2 SERPL-SCNC: 23 MMOL/L — SIGNIFICANT CHANGE UP (ref 22–31)
CREAT SERPL-MCNC: 0.66 MG/DL — SIGNIFICANT CHANGE UP (ref 0.5–1.3)
CREAT SERPL-MCNC: 0.72 MG/DL — SIGNIFICANT CHANGE UP (ref 0.5–1.3)
GLUCOSE SERPL-MCNC: 140 MG/DL — HIGH (ref 70–99)
GLUCOSE SERPL-MCNC: 142 MG/DL — HIGH (ref 70–99)
HCT VFR BLD CALC: 24.8 % — LOW (ref 39–50)
HCT VFR BLD CALC: 25 % — LOW (ref 39–50)
HGB BLD-MCNC: 8.2 G/DL — LOW (ref 13–17)
HGB BLD-MCNC: 8.4 G/DL — LOW (ref 13–17)
INR BLD: 1.3 — HIGH (ref 0.88–1.16)
LACTATE SERPL-SCNC: 0.7 MMOL/L — SIGNIFICANT CHANGE UP (ref 0.5–2)
MAGNESIUM SERPL-MCNC: 2.2 MG/DL — SIGNIFICANT CHANGE UP (ref 1.6–2.4)
MAGNESIUM SERPL-MCNC: 2.2 MG/DL — SIGNIFICANT CHANGE UP (ref 1.6–2.4)
MCHC RBC-ENTMCNC: 26.5 PG — LOW (ref 27–34)
MCHC RBC-ENTMCNC: 26.8 PG — LOW (ref 27–34)
MCHC RBC-ENTMCNC: 33.1 G/DL — SIGNIFICANT CHANGE UP (ref 32–36)
MCHC RBC-ENTMCNC: 33.6 G/DL — SIGNIFICANT CHANGE UP (ref 32–36)
MCV RBC AUTO: 79.6 FL — LOW (ref 80–100)
MCV RBC AUTO: 80.3 FL — SIGNIFICANT CHANGE UP (ref 80–100)
PHOSPHATE SERPL-MCNC: 3.2 MG/DL — SIGNIFICANT CHANGE UP (ref 2.5–4.5)
PLATELET # BLD AUTO: 267 K/UL — SIGNIFICANT CHANGE UP (ref 150–400)
PLATELET # BLD AUTO: 294 K/UL — SIGNIFICANT CHANGE UP (ref 150–400)
POTASSIUM SERPL-MCNC: 3.3 MMOL/L — LOW (ref 3.5–5.3)
POTASSIUM SERPL-MCNC: 3.6 MMOL/L — SIGNIFICANT CHANGE UP (ref 3.5–5.3)
POTASSIUM SERPL-SCNC: 3.3 MMOL/L — LOW (ref 3.5–5.3)
POTASSIUM SERPL-SCNC: 3.6 MMOL/L — SIGNIFICANT CHANGE UP (ref 3.5–5.3)
PROT SERPL-MCNC: 5.6 G/DL — LOW (ref 6.4–8.2)
PROTHROM AB SERPL-ACNC: 14.5 SEC — HIGH (ref 10–13.1)
RBC # BLD: 3.09 M/UL — LOW (ref 4.2–5.8)
RBC # BLD: 3.14 M/UL — LOW (ref 4.2–5.8)
RBC # FLD: 14.4 % — SIGNIFICANT CHANGE UP (ref 10.3–16.9)
RBC # FLD: 14.8 % — SIGNIFICANT CHANGE UP (ref 10.3–16.9)
SODIUM SERPL-SCNC: 138 MMOL/L — SIGNIFICANT CHANGE UP (ref 135–145)
SODIUM SERPL-SCNC: 139 MMOL/L — SIGNIFICANT CHANGE UP (ref 135–145)
WBC # BLD: 5.1 K/UL — SIGNIFICANT CHANGE UP (ref 3.8–10.5)
WBC # BLD: 5.4 K/UL — SIGNIFICANT CHANGE UP (ref 3.8–10.5)
WBC # FLD AUTO: 5.1 K/UL — SIGNIFICANT CHANGE UP (ref 3.8–10.5)
WBC # FLD AUTO: 5.4 K/UL — SIGNIFICANT CHANGE UP (ref 3.8–10.5)

## 2017-01-21 PROCEDURE — 74000: CPT | Mod: 26

## 2017-01-21 PROCEDURE — 71010: CPT | Mod: 26

## 2017-01-21 RX ORDER — POTASSIUM CHLORIDE 20 MEQ
20 PACKET (EA) ORAL ONCE
Qty: 0 | Refills: 0 | Status: COMPLETED | OUTPATIENT
Start: 2017-01-21 | End: 2017-01-21

## 2017-01-21 RX ORDER — POTASSIUM CHLORIDE 20 MEQ
20 PACKET (EA) ORAL
Qty: 0 | Refills: 0 | Status: COMPLETED | OUTPATIENT
Start: 2017-01-21 | End: 2017-01-21

## 2017-01-21 RX ORDER — POTASSIUM CHLORIDE 20 MEQ
40 PACKET (EA) ORAL ONCE
Qty: 0 | Refills: 0 | Status: COMPLETED | OUTPATIENT
Start: 2017-01-21 | End: 2017-01-21

## 2017-01-21 RX ADMIN — Medication 100 MILLIEQUIVALENT(S): at 17:10

## 2017-01-21 RX ADMIN — HEPARIN SODIUM 7500 UNIT(S): 5000 INJECTION INTRAVENOUS; SUBCUTANEOUS at 14:10

## 2017-01-21 RX ADMIN — Medication 0.5 MILLIGRAM(S): at 07:14

## 2017-01-21 RX ADMIN — HEPARIN SODIUM 7500 UNIT(S): 5000 INJECTION INTRAVENOUS; SUBCUTANEOUS at 06:06

## 2017-01-21 RX ADMIN — Medication 100 MILLIEQUIVALENT(S): at 02:33

## 2017-01-21 RX ADMIN — SODIUM CHLORIDE 3 MILLILITER(S): 9 INJECTION INTRAMUSCULAR; INTRAVENOUS; SUBCUTANEOUS at 21:34

## 2017-01-21 RX ADMIN — Medication 100 MILLIEQUIVALENT(S): at 07:13

## 2017-01-21 RX ADMIN — Medication 40 MILLIEQUIVALENT(S): at 15:06

## 2017-01-21 RX ADMIN — HEPARIN SODIUM 7500 UNIT(S): 5000 INJECTION INTRAVENOUS; SUBCUTANEOUS at 21:25

## 2017-01-21 RX ADMIN — Medication 5 MILLIGRAM(S): at 14:10

## 2017-01-21 RX ADMIN — Medication 0.5 MILLIGRAM(S): at 17:13

## 2017-01-21 RX ADMIN — PANTOPRAZOLE SODIUM 40 MILLIGRAM(S): 20 TABLET, DELAYED RELEASE ORAL at 11:19

## 2017-01-21 RX ADMIN — Medication 100 MILLIEQUIVALENT(S): at 05:20

## 2017-01-21 RX ADMIN — Medication 100 MILLIEQUIVALENT(S): at 01:04

## 2017-01-21 RX ADMIN — Medication 5 MILLIGRAM(S): at 21:26

## 2017-01-21 RX ADMIN — SODIUM CHLORIDE 3 MILLILITER(S): 9 INJECTION INTRAMUSCULAR; INTRAVENOUS; SUBCUTANEOUS at 13:46

## 2017-01-21 RX ADMIN — AMIODARONE HYDROCHLORIDE 200 MILLIGRAM(S): 400 TABLET ORAL at 04:00

## 2017-01-21 RX ADMIN — Medication 100 MILLIEQUIVALENT(S): at 10:14

## 2017-01-21 RX ADMIN — Medication 5 MILLIGRAM(S): at 06:06

## 2017-01-21 RX ADMIN — SODIUM CHLORIDE 3 MILLILITER(S): 9 INJECTION INTRAMUSCULAR; INTRAVENOUS; SUBCUTANEOUS at 07:13

## 2017-01-21 RX ADMIN — Medication 100 MILLIEQUIVALENT(S): at 07:53

## 2017-01-21 RX ADMIN — Medication 100 MILLIEQUIVALENT(S): at 00:19

## 2017-01-21 RX ADMIN — Medication 81 MILLIGRAM(S): at 11:19

## 2017-01-21 RX ADMIN — Medication 100 MILLIEQUIVALENT(S): at 10:13

## 2017-01-21 NOTE — PROGRESS NOTE ADULT - SUBJECTIVE AND OBJECTIVE BOX
Patient discussed on morning rounds with Dr. Ward      Operation / Date: Ascending aortic     SUBJECTIVE ASSESSMENT:  76y Male         Vital Signs Last 24 Hrs  T(C): 36.9, Max: 36.9 (01-21 @ 14:13)  T(F): 98.5, Max: 98.5 (01-21 @ 14:13)  HR: 90 (68 - 90)  BP: 157/69 (102/51 - 708/60)  BP(mean): 111 (73 - 111)  RR: 17 (16 - 21)  SpO2: 95% (95% - 100%)  I&O's Detail      CHEST TUBE:  Yes/No. AIR LEAKS: Yes/No. Suction / H2O SEAL.   ELIANA DRAIN:  Yes/No.  EPICARDIAL WIRES: Yes/No.  TIE DOWNS: Yes/No.  TAY: Yes/No.    PHYSICAL EXAM:    General:     Neurological:    Cardiovascular:    Respiratory:    Gastrointestinal:    Extremities:    Vascular:    Incision Sites:    LABS:                        8.4    5.4   )-----------( 267      ( 21 Jan 2017 13:50 )             25.0       COUMADIN:  Yes/No. REASON: .    PT/INR - ( 21 Jan 2017 05:01 )   PT: 14.5 sec;   INR: 1.30          PTT - ( 21 Jan 2017 05:01 )  PTT:31.1 sec    21 Jan 2017 13:50    138    |  106    |  18     ----------------------------<  142    3.6     |  22     |  0.72     Ca    7.7        21 Jan 2017 13:50  Phos  3.2       21 Jan 2017 05:00  Mg     2.2       21 Jan 2017 13:50    TPro  5.6    /  Alb  2.5    /  TBili  0.6    /  DBili  x      /  AST  15     /  ALT  33     /  AlkPhos  54     21 Jan 2017 05:00          MEDICATIONS  (STANDING):  aspirin enteric coated 81milliGRAM(s) Oral daily  buDESOnide   0.5 milliGRAM(s) Respule 0.5milliGRAM(s) Inhalation every 12 hours  heparin  Injectable 7500Unit(s) SubCutaneous every 8 hours  sodium chloride 0.9% lock flush 3milliLiter(s) IV Push every 8 hours  pantoprazole  Injectable 40milliGRAM(s) IV Push daily  amiodarone IVPB 150milliGRAM(s) IV Intermittent daily  acetaminophen  IVPB. 1000milliGRAM(s) IV Intermittent once  insulin lispro (HumaLOG) corrective regimen sliding scale  SubCutaneous every 6 hours  fat emulsion 20% Infusion 50Gram(s) IV Continuous <Continuous>  Parenteral Nutrition - Adult 1Each TPN Continuous <Continuous>  metoprolol Injectable 5milliGRAM(s) IV Push every 8 hours  polyethylene glycol 3350 17Gram(s) Oral daily    MEDICATIONS  (PRN):        RADIOLOGY & ADDITIONAL TESTS: Patient discussed on morning rounds with Dr. Ward and Dr. Bruno    Operation / Date: Ascending aortic and hemiarch replacement with reimplantation of coronaries, EF 60% on 1/10/17    SUBJECTIVE ASSESSMENT:  76y Male seen at bedside this AM.  Pt reports he is feeling improved compared to yesterday with continued episodes of loose stools this morning.  Would like to progress to more solid foods.  No acute overnight events.  Denies HA, CP, palpitations, SOB, n/v, fever.          Vital Signs Last 24 Hrs  T(C): 36.9, Max: 36.9 (01-21 @ 14:13)  T(F): 98.5, Max: 98.5 (01-21 @ 14:13)  HR: 90 (68 - 90)  BP: 157/69 (102/51 - 708/60)  BP(mean): 111 (73 - 111)  RR: 17 (16 - 21)  SpO2: 95% (95% - 100%)  I&O's Detail      CHEST TUBE:  No.    ELIANA DRAIN:  No.  EPICARDIAL WIRES: No.  TIE DOWNS: No.  TAY: No.    PHYSICAL EXAM:    General: OOB to chair, resting comfortably and eating breakfast.     Neurological: AAOx3, no AMS or focal deficits.     Cardiovascular: RRR, S1/S2, no m/r/g.     Respiratory: NAD, CTA b/l, no w/r/r.     Gastrointestinal: Slightly distended, soft, with hyperactive bowel sounds.  Non-ttp.      Extremities: WWP, no clubbing, cyanosis, or edema.  No calf ttp b/l.    Vascular: Pulses 2+ in b/l R, DP, PT.    Incision Sites: Sternotomy incision: C/D/I, no active bleeding or purulence.     LABS:                        8.4    5.4   )-----------( 267      ( 21 Jan 2017 13:50 )             25.0       COUMADIN:  No.     PT/INR - ( 21 Jan 2017 05:01 )   PT: 14.5 sec;   INR: 1.30          PTT - ( 21 Jan 2017 05:01 )  PTT:31.1 sec    21 Jan 2017 13:50    138    |  106    |  18     ----------------------------<  142    3.6     |  22     |  0.72     Ca    7.7        21 Jan 2017 13:50  Phos  3.2       21 Jan 2017 05:00  Mg     2.2       21 Jan 2017 13:50    TPro  5.6    /  Alb  2.5    /  TBili  0.6    /  DBili  x      /  AST  15     /  ALT  33     /  AlkPhos  54     21 Jan 2017 05:00      MEDICATIONS  (STANDING):  aspirin enteric coated 81milliGRAM(s) Oral daily  buDESOnide   0.5 milliGRAM(s) Respule 0.5milliGRAM(s) Inhalation every 12 hours  heparin  Injectable 7500Unit(s) SubCutaneous every 8 hours  sodium chloride 0.9% lock flush 3milliLiter(s) IV Push every 8 hours  pantoprazole  Injectable 40milliGRAM(s) IV Push daily  amiodarone IVPB 150milliGRAM(s) IV Intermittent daily  acetaminophen  IVPB. 1000milliGRAM(s) IV Intermittent once  insulin lispro (HumaLOG) corrective regimen sliding scale  SubCutaneous every 6 hours  fat emulsion 20% Infusion 50Gram(s) IV Continuous <Continuous>  Parenteral Nutrition - Adult 1Each TPN Continuous <Continuous>  metoprolol Injectable 5milliGRAM(s) IV Push every 8 hours  polyethylene glycol 3350 17Gram(s) Oral daily    MEDICATIONS  (PRN):        RADIOLOGY & ADDITIONAL TESTS:    EXAM:  XR CHEST 1 VIEW PORT ROUTINE                           PROCEDURE DATE:  01/21/2017           INTERPRETATION:  Clinical History: Postop    Portable examination the chest demonstrates to be status post sternotomy.   No acute infiltrates.Right internal jugular line noted with tip   overlying. Vena cava. Dextro scoliosis thoracic spine.    Impression: No acute infiltrates    EXAM:  XR ABDOMEN  1 VIEW PORT ROUT                           PROCEDURE DATE:  01/21/2017      INTERPRETATION:  Clinical History: Ileus    Portable examination of the abdomen demonstrates mild dilated loops of   small bowel which may reflect ileus. No evidence of obstruction.    Impression: Mild dilated loops of small bowel which may reflect ileus. No   evidence of obstruction. Clinical correlation and follow-up recommended 9E > 9L Transfer Note    Patient discussed on morning rounds with Dr. Ward and Dr. Bruno    Operation / Date: Ascending aortic and hemiarch replacement with reimplantation of coronaries, EF 60% on 1/10/17    SUBJECTIVE ASSESSMENT:  76y Male seen at bedside this AM.  Pt reports he is feeling improved compared to yesterday with continued episodes of loose stools this morning.  Would like to progress to more solid foods.  No acute overnight events.  Denies HA, CP, palpitations, SOB, n/v, fever.          Vital Signs Last 24 Hrs  T(C): 36.9, Max: 36.9 (01-21 @ 14:13)  T(F): 98.5, Max: 98.5 (01-21 @ 14:13)  HR: 90 (68 - 90)  BP: 157/69 (102/51 - 708/60)  BP(mean): 111 (73 - 111)  RR: 17 (16 - 21)  SpO2: 95% (95% - 100%)  I&O's Detail      CHEST TUBE:  No.    ELIANA DRAIN:  No.  EPICARDIAL WIRES: No.  TIE DOWNS: No.  TAY: No.    PHYSICAL EXAM:    General: OOB to chair, resting comfortably and eating breakfast.     Neurological: AAOx3, no AMS or focal deficits.     Cardiovascular: RRR, S1/S2, no m/r/g.     Respiratory: NAD, CTA b/l, no w/r/r.     Gastrointestinal: Slightly distended, soft, with hyperactive bowel sounds.  Non-ttp.      Extremities: WWP, no clubbing, cyanosis, or edema.  No calf ttp b/l.    Vascular: Pulses 2+ in b/l R, DP, PT.    Incision Sites: Sternotomy incision: C/D/I, no active bleeding or purulence.     LABS:                        8.4    5.4   )-----------( 267      ( 21 Jan 2017 13:50 )             25.0       COUMADIN:  No.     PT/INR - ( 21 Jan 2017 05:01 )   PT: 14.5 sec;   INR: 1.30          PTT - ( 21 Jan 2017 05:01 )  PTT:31.1 sec    21 Jan 2017 13:50    138    |  106    |  18     ----------------------------<  142    3.6     |  22     |  0.72     Ca    7.7        21 Jan 2017 13:50  Phos  3.2       21 Jan 2017 05:00  Mg     2.2       21 Jan 2017 13:50    TPro  5.6    /  Alb  2.5    /  TBili  0.6    /  DBili  x      /  AST  15     /  ALT  33     /  AlkPhos  54     21 Jan 2017 05:00      MEDICATIONS  (STANDING):  aspirin enteric coated 81milliGRAM(s) Oral daily  buDESOnide   0.5 milliGRAM(s) Respule 0.5milliGRAM(s) Inhalation every 12 hours  heparin  Injectable 7500Unit(s) SubCutaneous every 8 hours  sodium chloride 0.9% lock flush 3milliLiter(s) IV Push every 8 hours  pantoprazole  Injectable 40milliGRAM(s) IV Push daily  amiodarone IVPB 150milliGRAM(s) IV Intermittent daily  acetaminophen  IVPB. 1000milliGRAM(s) IV Intermittent once  insulin lispro (HumaLOG) corrective regimen sliding scale  SubCutaneous every 6 hours  fat emulsion 20% Infusion 50Gram(s) IV Continuous <Continuous>  Parenteral Nutrition - Adult 1Each TPN Continuous <Continuous>  metoprolol Injectable 5milliGRAM(s) IV Push every 8 hours  polyethylene glycol 3350 17Gram(s) Oral daily    MEDICATIONS  (PRN):        RADIOLOGY & ADDITIONAL TESTS:    EXAM:  XR CHEST 1 VIEW PORT ROUTINE                           PROCEDURE DATE:  01/21/2017           INTERPRETATION:  Clinical History: Postop    Portable examination the chest demonstrates to be status post sternotomy.   No acute infiltrates.Right internal jugular line noted with tip   overlying. Vena cava. Dextro scoliosis thoracic spine.    Impression: No acute infiltrates    EXAM:  XR ABDOMEN  1 VIEW PORT ROUT                           PROCEDURE DATE:  01/21/2017      INTERPRETATION:  Clinical History: Ileus    Portable examination of the abdomen demonstrates mild dilated loops of   small bowel which may reflect ileus. No evidence of obstruction.    Impression: Mild dilated loops of small bowel which may reflect ileus. No   evidence of obstruction. Clinical correlation and follow-up recommended

## 2017-01-21 NOTE — PROGRESS NOTE ADULT - ASSESSMENT
76y M with history of CAD s/p CABGx4 in 2005, asthma, COPD, HTN, HLD, PAUL with use of CPAP at night, temporal arteritis, hiatal hernia, cervical and lumbar stenosis, prostate ca s/p radical prostatectomy in 2003 who presented for elective operation for thoracic aortic aneurysm 6.2x5.9cm that was found on CT 76y M with history of CAD s/p CABGx4 in 2005, asthma, COPD, HTN, HLD, PAUL with use of CPAP at night, temporal arteritis, hiatal hernia, cervical and lumbar stenosis, prostate ca s/p radical prostatectomy in 2003 who presented for elective operation for thoracic aortic aneurysm.  CT chest on 6/28/16 showed ascending aorta 6.2x5.9cm, aortic arch 4.9cm, descending aorta 4x3.6cm, and abdominal aorta 4.2cm.  Pt was originally scheduled for operation in 11/2016 but suffered from a post-procedural ischemic stroke after cardiac cath.  Pt recovered and subsequently underwent ascending aortic and hemiarch replacement with reimplantation of coronaries with Dr. Villalba on 1/10/17.  Post-op development of AF, on Amio load, and ileus, GI has been following.  Abd Xray improved this AM, pt advanced to full lactose free diet as tolerated.     1. Neuro 76y M with history of CAD s/p CABGx4 in 2005, asthma, COPD, HTN, HLD, APUL with use of CPAP at night, temporal arteritis, hiatal hernia, cervical and lumbar stenosis, prostate ca s/p radical prostatectomy in 2003 who presented for elective operation for thoracic aortic aneurysm.  CT chest on 6/28/16 showed ascending aorta 6.2x5.9cm, aortic arch 4.9cm, descending aorta 4x3.6cm, and abdominal aorta 4.2cm.  Pt was originally scheduled for operation in 11/2016 but suffered from a post-procedural ischemic stroke after cardiac cath.  Pt recovered and subsequently underwent ascending aortic and hemiarch replacement with reimplantation of coronaries with Dr. Villalba on 1/10/17.  Post-op development of AF, on Amio load, and ileus, GI has been following.  Abd Xray improved this AM, pt advanced to full lactose free diet as tolerated.     1. Neuro- No acute issues   -continue to hold narcotics due to ileus    2. Respiratory: No acute issues  -CXR in AM  -IS and ambulation    3. CVS: HD stable 76y M with history of CAD s/p CABGx4 in 2005, asthma, COPD, HTN, HLD, PAUL with use of CPAP at night, temporal arteritis, hiatal hernia, cervical and lumbar stenosis, prostate ca s/p radical prostatectomy in 2003 who presented for elective operation for thoracic aortic aneurysm.  CT chest on 6/28/16 showed ascending aorta 6.2x5.9cm, aortic arch 4.9cm, descending aorta 4x3.6cm, and abdominal aorta 4.2cm.  Pt was originally scheduled for operation in 11/2016 but suffered from a post-procedural ischemic stroke after cardiac cath.  Pt recovered and subsequently underwent ascending aortic and hemiarch replacement with reimplantation of coronaries with Dr. Villalba on 1/10/17.  Post-op development of AF, on Amio load, and ileus, GI has been following.  Abd Xray improved this AM, pt advanced to full lactose free diet as tolerated.     1. Neuro- No acute issues   -continue to hold narcotics due to ileus    2. Respiratory: No acute issues  -CXR in AM  -IS and ambulation    3. CVS: HD stable, intermittent AF  -Monitor HR/BP/Tele  -C/w amiodarone, lopressor  -Transition to PO meds when pt can tolerate  -Replete lytes    4. GI: post-op ileus, improved on AM Abd XRay  -Advanced diet to full lactose free diet  -C/w daily miralax  -C/w PPX  -Abd XR in AM    5. /Renal: Cr 0.66, urinating without difficulty  -Continue to monitor I/O's and renal function  -Trend Cr on AM Labs    6. Endo: A1C: 6  -C/w RISS and monitor FS    7. Heme: H/H stable  -No acute issues  -PPX: HSQ 7500u SQ q8h and SCDs    8. ID: Afebrile, asymptomatic  -Continue to monitor or SIRS    Dispo: Transfer to [facility] for stepdown care, home when medically ready.

## 2017-01-22 ENCOUNTER — TRANSCRIPTION ENCOUNTER (OUTPATIENT)
Age: 77
End: 2017-01-22

## 2017-01-22 VITALS — TEMPERATURE: 98 F

## 2017-01-22 LAB
ANION GAP SERPL CALC-SCNC: 11 MMOL/L — SIGNIFICANT CHANGE UP (ref 9–16)
BUN SERPL-MCNC: 13 MG/DL — SIGNIFICANT CHANGE UP (ref 7–23)
CALCIUM SERPL-MCNC: 8.4 MG/DL — LOW (ref 8.5–10.5)
CHLORIDE SERPL-SCNC: 106 MMOL/L — SIGNIFICANT CHANGE UP (ref 96–108)
CO2 SERPL-SCNC: 21 MMOL/L — LOW (ref 22–31)
CREAT SERPL-MCNC: 0.82 MG/DL — SIGNIFICANT CHANGE UP (ref 0.5–1.3)
GLUCOSE SERPL-MCNC: 104 MG/DL — HIGH (ref 70–99)
HCT VFR BLD CALC: 26.5 % — LOW (ref 39–50)
HGB BLD-MCNC: 8.8 G/DL — LOW (ref 13–17)
MAGNESIUM SERPL-MCNC: 2.1 MG/DL — SIGNIFICANT CHANGE UP (ref 1.6–2.4)
MCHC RBC-ENTMCNC: 26.3 PG — LOW (ref 27–34)
MCHC RBC-ENTMCNC: 33.2 G/DL — SIGNIFICANT CHANGE UP (ref 32–36)
MCV RBC AUTO: 79.3 FL — LOW (ref 80–100)
PHOSPHATE SERPL-MCNC: 3.7 MG/DL — SIGNIFICANT CHANGE UP (ref 2.5–4.5)
PLATELET # BLD AUTO: 285 K/UL — SIGNIFICANT CHANGE UP (ref 150–400)
POTASSIUM SERPL-MCNC: 3.3 MMOL/L — LOW (ref 3.5–5.3)
POTASSIUM SERPL-SCNC: 3.3 MMOL/L — LOW (ref 3.5–5.3)
RBC # BLD: 3.34 M/UL — LOW (ref 4.2–5.8)
RBC # FLD: 14.7 % — SIGNIFICANT CHANGE UP (ref 10.3–16.9)
SODIUM SERPL-SCNC: 138 MMOL/L — SIGNIFICANT CHANGE UP (ref 135–145)
WBC # BLD: 5.8 K/UL — SIGNIFICANT CHANGE UP (ref 3.8–10.5)
WBC # FLD AUTO: 5.8 K/UL — SIGNIFICANT CHANGE UP (ref 3.8–10.5)

## 2017-01-22 PROCEDURE — 84443 ASSAY THYROID STIM HORMONE: CPT

## 2017-01-22 PROCEDURE — 86850 RBC ANTIBODY SCREEN: CPT

## 2017-01-22 PROCEDURE — 36430 TRANSFUSION BLD/BLD COMPNT: CPT

## 2017-01-22 PROCEDURE — 93005 ELECTROCARDIOGRAM TRACING: CPT

## 2017-01-22 PROCEDURE — 86923 COMPATIBILITY TEST ELECTRIC: CPT

## 2017-01-22 PROCEDURE — 71045 X-RAY EXAM CHEST 1 VIEW: CPT

## 2017-01-22 PROCEDURE — C1889: CPT

## 2017-01-22 PROCEDURE — 74000: CPT | Mod: 26

## 2017-01-22 PROCEDURE — 94002 VENT MGMT INPAT INIT DAY: CPT

## 2017-01-22 PROCEDURE — 83735 ASSAY OF MAGNESIUM: CPT

## 2017-01-22 PROCEDURE — 80048 BASIC METABOLIC PNL TOTAL CA: CPT

## 2017-01-22 PROCEDURE — C1768: CPT

## 2017-01-22 PROCEDURE — 88305 TISSUE EXAM BY PATHOLOGIST: CPT

## 2017-01-22 PROCEDURE — 70450 CT HEAD/BRAIN W/O DYE: CPT

## 2017-01-22 PROCEDURE — 85730 THROMBOPLASTIN TIME PARTIAL: CPT

## 2017-01-22 PROCEDURE — 85027 COMPLETE CBC AUTOMATED: CPT

## 2017-01-22 PROCEDURE — 86900 BLOOD TYPING SEROLOGIC ABO: CPT

## 2017-01-22 PROCEDURE — 94660 CPAP INITIATION&MGMT: CPT

## 2017-01-22 PROCEDURE — P9035: CPT

## 2017-01-22 PROCEDURE — 84132 ASSAY OF SERUM POTASSIUM: CPT

## 2017-01-22 PROCEDURE — 82330 ASSAY OF CALCIUM: CPT

## 2017-01-22 PROCEDURE — 71010: CPT | Mod: 26

## 2017-01-22 PROCEDURE — 81003 URINALYSIS AUTO W/O SCOPE: CPT

## 2017-01-22 PROCEDURE — 85025 COMPLETE CBC W/AUTO DIFF WBC: CPT

## 2017-01-22 PROCEDURE — 85610 PROTHROMBIN TIME: CPT

## 2017-01-22 PROCEDURE — 85018 HEMOGLOBIN: CPT

## 2017-01-22 PROCEDURE — 80053 COMPREHEN METABOLIC PANEL: CPT

## 2017-01-22 PROCEDURE — 89055 LEUKOCYTE ASSESSMENT FECAL: CPT

## 2017-01-22 PROCEDURE — P9012: CPT

## 2017-01-22 PROCEDURE — 94640 AIRWAY INHALATION TREATMENT: CPT

## 2017-01-22 PROCEDURE — 74018 RADEX ABDOMEN 1 VIEW: CPT

## 2017-01-22 PROCEDURE — 97116 GAIT TRAINING THERAPY: CPT

## 2017-01-22 PROCEDURE — 81001 URINALYSIS AUTO W/SCOPE: CPT

## 2017-01-22 PROCEDURE — 97001: CPT

## 2017-01-22 PROCEDURE — 83690 ASSAY OF LIPASE: CPT

## 2017-01-22 PROCEDURE — C1894: CPT

## 2017-01-22 PROCEDURE — 83880 ASSAY OF NATRIURETIC PEPTIDE: CPT

## 2017-01-22 PROCEDURE — P9016: CPT

## 2017-01-22 PROCEDURE — 36415 COLL VENOUS BLD VENIPUNCTURE: CPT

## 2017-01-22 PROCEDURE — 93306 TTE W/DOPPLER COMPLETE: CPT

## 2017-01-22 PROCEDURE — 80076 HEPATIC FUNCTION PANEL: CPT

## 2017-01-22 PROCEDURE — 86901 BLOOD TYPING SEROLOGIC RH(D): CPT

## 2017-01-22 PROCEDURE — 83605 ASSAY OF LACTIC ACID: CPT

## 2017-01-22 PROCEDURE — 94150 VITAL CAPACITY TEST: CPT

## 2017-01-22 PROCEDURE — 84295 ASSAY OF SERUM SODIUM: CPT

## 2017-01-22 PROCEDURE — 74177 CT ABD & PELVIS W/CONTRAST: CPT

## 2017-01-22 PROCEDURE — P9045: CPT

## 2017-01-22 PROCEDURE — 83036 HEMOGLOBIN GLYCOSYLATED A1C: CPT

## 2017-01-22 PROCEDURE — 84100 ASSAY OF PHOSPHORUS: CPT

## 2017-01-22 PROCEDURE — 74020: CPT

## 2017-01-22 PROCEDURE — 82803 BLOOD GASES ANY COMBINATION: CPT

## 2017-01-22 PROCEDURE — 82150 ASSAY OF AMYLASE: CPT

## 2017-01-22 PROCEDURE — C1769: CPT

## 2017-01-22 RX ORDER — ATENOLOL 25 MG/1
1 TABLET ORAL
Qty: 0 | Refills: 0 | COMMUNITY

## 2017-01-22 RX ORDER — ACETAMINOPHEN 500 MG
650 TABLET ORAL EVERY 6 HOURS
Qty: 0 | Refills: 0 | Status: DISCONTINUED | OUTPATIENT
Start: 2017-01-22 | End: 2017-01-22

## 2017-01-22 RX ORDER — ACETAMINOPHEN 500 MG
2 TABLET ORAL
Qty: 240 | Refills: 0 | OUTPATIENT
Start: 2017-01-22 | End: 2017-02-21

## 2017-01-22 RX ORDER — PANTOPRAZOLE SODIUM 20 MG/1
40 TABLET, DELAYED RELEASE ORAL
Qty: 0 | Refills: 0 | Status: DISCONTINUED | OUTPATIENT
Start: 2017-01-22 | End: 2017-01-22

## 2017-01-22 RX ORDER — ASPIRIN/CALCIUM CARB/MAGNESIUM 324 MG
1 TABLET ORAL
Qty: 30 | Refills: 0 | OUTPATIENT
Start: 2017-01-22 | End: 2017-02-21

## 2017-01-22 RX ORDER — POTASSIUM CHLORIDE 20 MEQ
2 PACKET (EA) ORAL
Qty: 12 | Refills: 0 | OUTPATIENT
Start: 2017-01-22 | End: 2017-01-25

## 2017-01-22 RX ORDER — FLUOXETINE HCL 10 MG
40 CAPSULE ORAL DAILY
Qty: 0 | Refills: 0 | Status: DISCONTINUED | OUTPATIENT
Start: 2017-01-22 | End: 2017-01-22

## 2017-01-22 RX ORDER — PANTOPRAZOLE SODIUM 20 MG/1
1 TABLET, DELAYED RELEASE ORAL
Qty: 30 | Refills: 0 | OUTPATIENT
Start: 2017-01-22 | End: 2017-02-21

## 2017-01-22 RX ORDER — METOPROLOL TARTRATE 50 MG
1 TABLET ORAL
Qty: 60 | Refills: 0 | OUTPATIENT
Start: 2017-01-22 | End: 2017-02-21

## 2017-01-22 RX ORDER — AMIODARONE HYDROCHLORIDE 400 MG/1
200 TABLET ORAL DAILY
Qty: 0 | Refills: 0 | Status: DISCONTINUED | OUTPATIENT
Start: 2017-01-22 | End: 2017-01-22

## 2017-01-22 RX ORDER — FLUOXETINE HCL 10 MG
1 CAPSULE ORAL
Qty: 0 | Refills: 0 | COMMUNITY

## 2017-01-22 RX ORDER — AMIODARONE HYDROCHLORIDE 400 MG/1
1 TABLET ORAL
Qty: 30 | Refills: 0 | OUTPATIENT
Start: 2017-01-22 | End: 2017-02-21

## 2017-01-22 RX ORDER — ASPIRIN/CALCIUM CARB/MAGNESIUM 324 MG
1 TABLET ORAL
Qty: 0 | Refills: 0 | COMMUNITY

## 2017-01-22 RX ORDER — ATORVASTATIN CALCIUM 80 MG/1
1 TABLET, FILM COATED ORAL
Qty: 30 | Refills: 0 | OUTPATIENT
Start: 2017-01-22 | End: 2017-02-21

## 2017-01-22 RX ORDER — POTASSIUM CHLORIDE 20 MEQ
40 PACKET (EA) ORAL
Qty: 0 | Refills: 0 | Status: COMPLETED | OUTPATIENT
Start: 2017-01-22 | End: 2017-01-22

## 2017-01-22 RX ORDER — BUDESONIDE, MICRONIZED 100 %
2 POWDER (GRAM) MISCELLANEOUS
Qty: 1 | Refills: 0 | OUTPATIENT
Start: 2017-01-22 | End: 2017-01-29

## 2017-01-22 RX ORDER — POTASSIUM CHLORIDE 20 MEQ
40 PACKET (EA) ORAL ONCE
Qty: 0 | Refills: 0 | Status: COMPLETED | OUTPATIENT
Start: 2017-01-22 | End: 2017-01-22

## 2017-01-22 RX ORDER — ATORVASTATIN CALCIUM 80 MG/1
1 TABLET, FILM COATED ORAL
Qty: 0 | Refills: 0 | COMMUNITY

## 2017-01-22 RX ORDER — METOPROLOL TARTRATE 50 MG
25 TABLET ORAL EVERY 12 HOURS
Qty: 0 | Refills: 0 | Status: DISCONTINUED | OUTPATIENT
Start: 2017-01-22 | End: 2017-01-22

## 2017-01-22 RX ORDER — FLUOXETINE HCL 10 MG
1 CAPSULE ORAL
Qty: 30 | Refills: 0 | OUTPATIENT
Start: 2017-01-22 | End: 2017-02-21

## 2017-01-22 RX ORDER — FLUOXETINE HCL 10 MG
10 CAPSULE ORAL DAILY
Qty: 0 | Refills: 0 | Status: DISCONTINUED | OUTPATIENT
Start: 2017-01-22 | End: 2017-01-22

## 2017-01-22 RX ADMIN — AMIODARONE HYDROCHLORIDE 200 MILLIGRAM(S): 400 TABLET ORAL at 06:34

## 2017-01-22 RX ADMIN — HEPARIN SODIUM 7500 UNIT(S): 5000 INJECTION INTRAVENOUS; SUBCUTANEOUS at 06:34

## 2017-01-22 RX ADMIN — SODIUM CHLORIDE 3 MILLILITER(S): 9 INJECTION INTRAMUSCULAR; INTRAVENOUS; SUBCUTANEOUS at 06:42

## 2017-01-22 RX ADMIN — HEPARIN SODIUM 7500 UNIT(S): 5000 INJECTION INTRAVENOUS; SUBCUTANEOUS at 13:49

## 2017-01-22 RX ADMIN — Medication 40 MILLIEQUIVALENT(S): at 13:51

## 2017-01-22 RX ADMIN — SODIUM CHLORIDE 3 MILLILITER(S): 9 INJECTION INTRAMUSCULAR; INTRAVENOUS; SUBCUTANEOUS at 13:55

## 2017-01-22 RX ADMIN — Medication 0.5 MILLIGRAM(S): at 06:34

## 2017-01-22 RX ADMIN — Medication 650 MILLIGRAM(S): at 13:49

## 2017-01-22 RX ADMIN — PANTOPRAZOLE SODIUM 40 MILLIGRAM(S): 20 TABLET, DELAYED RELEASE ORAL at 12:21

## 2017-01-22 RX ADMIN — Medication 40 MILLIEQUIVALENT(S): at 10:13

## 2017-01-22 RX ADMIN — Medication 81 MILLIGRAM(S): at 12:20

## 2017-01-22 NOTE — DISCHARGE NOTE ADULT - MEDICATION SUMMARY - MEDICATIONS TO TAKE
I will START or STAY ON the medications listed below when I get home from the hospital:    budesonide 0.5 mg/2 mL inhalation suspension  -- 2 puff(s) inhaled 2 times a day  -- Indication: For Shortness of breath    aspirin 81 mg oral delayed release tablet  -- 1 tab(s) by mouth once a day  -- Indication: For Blood thinner    acetaminophen 325 mg oral tablet  -- 2 tab(s) by mouth every 6 hours, As needed, Mild Pain (1 - 3)  -- Indication: For As needed for pain    amiodarone 200 mg oral tablet  -- 1 tab(s) by mouth once a day  -- Indication: For Atrial fibrillation    FLUoxetine 40 mg oral capsule  -- 1 cap(s) by mouth once a day  -- Indication: For Depression    Lipitor 40 mg oral tablet  -- 1 tab(s) by mouth once a day  -- Indication: For Cholesterol    metoprolol tartrate 25 mg oral tablet  -- 1 tab(s) by mouth every 12 hours  -- Indication: For Blood Pressure    potassium chloride 20 mEq oral tablet, extended release  -- 2 tab(s) by mouth 2 times a day  -- Indication: For Potassium supplementation    pantoprazole 40 mg oral delayed release tablet  -- 1 tab(s) by mouth once a day (before a meal)  -- Indication: For Acid Reflux

## 2017-01-22 NOTE — DISCHARGE NOTE ADULT - CARE PROVIDERS DIRECT ADDRESSES
,[email or web address]

## 2017-01-22 NOTE — PROGRESS NOTE ADULT - PROVIDER SPECIALTY LIST ADULT
CT Surgery
CT Surgery
Critical Care
Gastroenterology
Surgery
Surgery
CT Surgery

## 2017-01-22 NOTE — DISCHARGE NOTE ADULT - CARE PROVIDER_API CALL
Cayetano Villalba (MD), Surgery; Thoracic and Cardiac Surgery  [address] [place] [address]  Phone: [phone]  Fax: [phone]

## 2017-01-22 NOTE — PROGRESS NOTE ADULT - SUBJECTIVE AND OBJECTIVE BOX
Patient discussed on morning rounds with Dr. Ward     Operation / Date: 1/10/17 Ascending aortic/hemiarch replacement with reimplantation of coronaries     Surgeon: Dr. Villalba     Referring Physician: Dr. Phan     SUBJECTIVE ASSESSMENT:  Patient seen this morning at bedside. Patient feeling well and not offering any complaints at this time. Patient still having episodes of loose stool but improving and denies any abdominal pain, nausea or vomiting. Tolerating lactose free diet well. Denies any chest pain, shortness of breath, palpitations. Has been ambulating without difficulty. Patient eager and ready for discharge to a friends house today.     Hospital Course:   76 year old male with history of CAD s/p CABG x4 in 2005, asthma, COPD, hypertension, hyperlipidemia, obstructive sleep apnea uses CPAP at night, Temporal arteritis, hiatal hernia, cervical and lumbar stenosis, prostate cancer s/p radial prostatectomy in 2003 (no radiation or chemo) admitted to [facility] on 1/9/17 for elective operation for thoracic aortic aneurysm. Patient had known thoracic aortic aneursym, that was discovered incidentally on routine diagnostic imaging. Patient has been undergoing serial CT scans every year or two in order to monitor for growth. CT chest on 6/28/16 revealed an ascending aorta measuring 6.2 x 5.9cm, aortic arch 4.9cm descending aora 4 x3.6cm and abdominal aorta 4.2cm. Patient is from [place], [place] and decided to come to [place] for operation. He was suppose to undergo the operation in November 2016 but during his pre-operative cardiac catheterization he had a post procedural ischemic stroke with no residual neurological deficits. Elective procedure was post-poned 6 weeks. Patient underwent Ascending Aortic/hemiarch replacement with reimplantation of coronaries with Dr. Villalba on 1/10/17. Intraop patient received 2u pRBC, 2u FFP, 2u Cryo and 2u platelets, procedure was uncomplicated and patient was transferred to CT ICU intubated and on no drips. Patient was extubated on POD#1. On POD#1 patient went into Atrial fibrillation and was treated with an amiodarone load and beta blockers and converted to sinus on POD#2. Patient remained in sinus rhythm throughout the rest of his admission. On POD#5 patient complained of abdominal distention and diarrhea. GI was consulted and continued abdominal workup, patient was found to have ileus with dilated loops of small bowel and colon. Neostigmine was given and TPN was started on POD#9. On POD#10 ileus improved on follow up xray and patient was started on full liquid diet. Patient's abdominal distention continued to improve and he was started on lactose free diet, tolerating it well up until day of discharge. TPN was discontinued on POD#11. Of note patient's potassium was low requiring multiple IV and PO doses. Patient will go home with potassium supplementation and repeat levels at his outpatient visit. As per Dr. Ward, patient stable and ready for discharge on POD#12 with outpatient follow up with Dr. Villalba prior to returning to [place] next week.       Vital Signs Last 24 Hrs  T(C): 36.9, Max: 36.9 (01-21 @ 14:13)  T(F): 98.4, Max: 98.5 (01-21 @ 14:13)  HR: 66 (66 - 90)  BP: 109/47 (104/31 - 157/69)  BP(mean): 80 (69 - 111)  RR: 18 (16 - 18)  SpO2: 96% (95% - 96%)  I&O's Detail      EPICARDIAL WIRES REMOVED: Yes.  TIE DOWNS REMOVED: Yes    PHYSICAL EXAM:    General: Patient sitting comfortably in chair, no acute distress     Neurological: Alert and oriented. No focal neurological deficits     Cardiovascular: S1S2, RRR, no murmurs appreciated on exam     Respiratory: Clear to ausculation bilaterally     Gastrointestinal: Abdomen soft, mildly distended, non tender. + Bowel sounds     Extremities: Warm and well perfused. No edema or calf tenderness     Vascular: Peripheral pulses 2+ bilaterally     Incision Sites: Sternotomy Incision C/D/I  Right thoracotomy incision C/D/I. No drainage or surrounding erythema   Right groin incision C/D/I. No drainage or surrounding erythema     LABS:                        8.8    5.8   )-----------( 285      ( 22 Jan 2017 08:01 )             26.5       COUMADIN:  No    PT/INR - ( 21 Jan 2017 05:01 )   PT: 14.5 sec;   INR: 1.30          PTT - ( 21 Jan 2017 05:01 )  PTT:31.1 sec    22 Jan 2017 08:01    138    |  106    |  13     ----------------------------<  104    3.3     |  21     |  0.82     Ca    8.4        22 Jan 2017 08:01  Phos  3.7       22 Jan 2017 08:01  Mg     2.1       22 Jan 2017 08:01    TPro  5.6    /  Alb  2.5    /  TBili  0.6    /  DBili  x      /  AST  15     /  ALT  33     /  AlkPhos  54     21 Jan 2017 05:00    MEDICATIONS  (STANDING): See Med Rec     Discharge CXR: 1/22/17 : No acute infiltrates    Discharge ECHO: 1/14/17: no gross wall motion abnormalities seen on available images.  The overall left ventricular ejection fraction is probably normal. No pericardial effusion seen on this limited study.    - Please follow up with Dr. Villalba on Thursday 1/26/17 at 2:00PM.  The office is located at [facility], 4th floor. Call us with any questions #[phone].  - Please continue a lactose free diet until seen by Dr. Villalba   - Please continue to take potassium supplements twice a day until seen by Dr. Villalba

## 2017-01-22 NOTE — DISCHARGE NOTE ADULT - PATIENT PORTAL LINK FT
“You can access the [facility] Patient Portal, offered by [facility], by registering with the following website: [email or web address]”

## 2017-01-22 NOTE — DISCHARGE NOTE ADULT - NS MD DC FALL RISK RISK
For information on Fall & Injury Prevention, visit [email or web address]

## 2017-01-22 NOTE — DISCHARGE NOTE ADULT - HOSPITAL COURSE
76 year old male with history of CAD s/p CABG x4 in 2005, asthma, COPD, hypertension, hyperlipidemia, obstructive sleep apnea uses CPAP at night, Temporal arteritis, hiatal hernia, cervical and lumbar stenosis, prostate cancer s/p radial prostatectomy in 2003 (no radiation or chemo) admitted to [facility] on 1/9/17 for elective operation for thoracic aortic aneurysm. Patient had known thoracic aortic aneursym, that was discovered incidentally on routine diagnostic imaging. Patient has been undergoing serial CT scans every year or two in order to monitor for growth. CT chest on 6/28/16 revealed an ascending aorta measuring 6.2 x 5.9cm, aortic arch 4.9cm descending aora 4 x3.6cm and abdominal aorta 4.2cm. Patient is from [place], [place] and decided to come to [place] for operation. He was suppose to undergo the operation in November 2016 but during his pre-operative cardiac catheterization he had a post procedural ischemic stroke with no residual neurological deficits. Elective procedure was post-poned 6 weeks. Patient underwent Ascending Aortic/hemiarch replacement with reimplantation of coronaries with Dr. Villalba on 1/10/17. Intraop patient received 2u pRBC, 2u FFP, 2u Cryo and 2u platelets, procedure was uncomplicated and patient was transferred to CT ICU intubated and on no drips. Patient was extubated on POD#1. On POD#1 patient went into Atrial fibrillation and was treated with an amiodarone load and beta blockers and converted to sinus on POD#2. Patient remained in sinus rhythm throughout the rest of his admission. On POD#5 patient complained of abdominal distention and diarrhea. GI was consulted and continued abdominal workup, patient was found to have ileus with dilated loops of small bowel and colon. Neostigmine was given and TPN was started on POD#9. On POD#10 ileus improved on follow up xray and patient was started on full liquid diet. Patient's abdominal distention continued to improve and he was started on lactose free diet, tolerating it well up until day of discharge. TPN was discontinued on POD#11. Of note patient's potassium was low requiring multiple IV and PO doses. Patient will go home with potassium supplementation and repeat levels at his outpatient visit. As per Dr. Ward, patient stable and ready for discharge on POD#12 with outpatient follow up with Dr. Villalba prior to returning to [place] next week.

## 2017-01-22 NOTE — DISCHARGE NOTE ADULT - CARE PLAN
Principal Discharge DX:	Thoracic aortic aneurysm without rupture  Goal:	s/p Ascending aortic/hemiarch replacement with reimplantation of coronaries  Instructions for follow-up, activity and diet:	- Please follow up with Dr. Villalba on Thursday 1/26/17 at 2:00PM.  The office is located at [facility], [facility], 4th floor. Call us with any questions #[phone].  - Please continue a lactose free diet until seen by Dr. Villalba   - Please continue to take potassium supplements twice a day until seen by Dr. Villalba   -Walk daily as tolerated and use your incentive spirometer every hour.    -No driving or strenuous activity/exercise for 6 weeks, or until cleared by your surgeon.    -Gently clean your incisions with anti-bacterial soap and water, pat dry.  You may leave them open to air.    -Call your doctor if you have shortness of breath, chest pain not relieved by pain medication, dizziness, fever >101.5, or increased redness or drainage from incisions. Principal Discharge DX:	Thoracic aortic aneurysm without rupture  Goal:	s/p Ascending aortic/hemiarch replacement with reimplantation of coronaries  Instructions for follow-up, activity and diet:	- Please follow up with Dr. Villalba on Thursday 1/26/17 at 2:00PM.  The office is located at [facility], [facility], 4th floor. Call us with any questions #[phone].  - Please continue a lactose free diet until seen by Dr. Villalba   - Please continue to take potassium supplements twice a day until seen by Dr. Villalba   -Walk daily as tolerated and use your incentive spirometer every hour.    -No driving or strenuous activity/exercise for 6 weeks, or until cleared by your surgeon.    -Gently clean your incisions with anti-bacterial soap and water, pat dry.  You may leave them open to air.    -Call your doctor if you have shortness of breath, chest pain not relieved by pain medication, dizziness, fever >101.5, or increased redness or drainage from incisions. Principal Discharge DX:	Thoracic aortic aneurysm without rupture  Goal:	s/p Ascending aortic/hemiarch replacement with reimplantation of coronaries  Instructions for follow-up, activity and diet:	- Please follow up with Dr. Villalba on Thursday 1/26/17 at 2:00PM.  The office is located at [facility], [facility], 4th floor. Call us with any questions #[phone].  - Please continue a lactose free diet until seen by Dr. Villalba   - Please continue to take potassium supplements twice a day until seen by Dr. Villalba   -Walk daily as tolerated and use your incentive spirometer every hour.    -No driving or strenuous activity/exercise for 6 weeks, or until cleared by your surgeon.    -Gently clean your incisions with anti-bacterial soap and water, pat dry.  You may leave them open to air.    -Call your doctor if you have shortness of breath, chest pain not relieved by pain medication, dizziness, fever >101.5, or increased redness or drainage from incisions. Principal Discharge DX:	Thoracic aortic aneurysm without rupture  Goal:	s/p Ascending aortic/hemiarch replacement with reimplantation of coronaries  Instructions for follow-up, activity and diet:	- Please follow up with Dr. Villalba on Thursday 1/26/17 at 2:00PM.  The office is located at [facility], [facility], 4th floor. Call us with any questions #[phone].  - Please continue a lactose free diet until seen by Dr. Villalba   - Please continue to take potassium supplements twice a day until seen by Dr. Villalba   -Walk daily as tolerated and use your incentive spirometer every hour.    -No driving or strenuous activity/exercise for 6 weeks, or until cleared by your surgeon.    -Gently clean your incisions with anti-bacterial soap and water, pat dry.  You may leave them open to air.    -Call your doctor if you have shortness of breath, chest pain not relieved by pain medication, dizziness, fever >101.5, or increased redness or drainage from incisions. Principal Discharge DX:	Thoracic aortic aneurysm without rupture  Goal:	s/p Ascending aortic/hemiarch replacement with reimplantation of coronaries  Instructions for follow-up, activity and diet:	- Please follow up with Dr. Villalba on Thursday 1/26/17 at 2:00PM.  The office is located at [facility], [facility], 4th floor. Call us with any questions #[phone].  - Please continue a lactose free diet until seen by Dr. Villalba   - Please continue to take potassium supplements twice a day for 3 days   -Walk daily as tolerated and use your incentive spirometer every hour.    -No driving or strenuous activity/exercise for 6 weeks, or until cleared by your surgeon.    -Gently clean your incisions with anti-bacterial soap and water, pat dry.  You may leave them open to air.    -Call your doctor if you have shortness of breath, chest pain not relieved by pain medication, dizziness, fever >101.5, or increased redness or drainage from incisions.

## 2017-01-24 DIAGNOSIS — I25.10 ATHEROSCLEROTIC HEART DISEASE OF NATIVE CORONARY ARTERY WITHOUT ANGINA PECTORIS: ICD-10-CM

## 2017-01-24 DIAGNOSIS — I71.2 THORACIC AORTIC ANEURYSM, WITHOUT RUPTURE: ICD-10-CM

## 2017-01-24 DIAGNOSIS — E78.5 HYPERLIPIDEMIA, UNSPECIFIED: ICD-10-CM

## 2017-01-24 DIAGNOSIS — M48.06 SPINAL STENOSIS, LUMBAR REGION: ICD-10-CM

## 2017-01-24 DIAGNOSIS — M31.6 OTHER GIANT CELL ARTERITIS: ICD-10-CM

## 2017-01-24 DIAGNOSIS — J44.9 CHRONIC OBSTRUCTIVE PULMONARY DISEASE, UNSPECIFIED: ICD-10-CM

## 2017-01-24 DIAGNOSIS — K56.7 ILEUS, UNSPECIFIED: ICD-10-CM

## 2017-01-24 DIAGNOSIS — K44.9 DIAPHRAGMATIC HERNIA WITHOUT OBSTRUCTION OR GANGRENE: ICD-10-CM

## 2017-01-24 DIAGNOSIS — J45.909 UNSPECIFIED ASTHMA, UNCOMPLICATED: ICD-10-CM

## 2017-01-24 DIAGNOSIS — Z85.46 PERSONAL HISTORY OF MALIGNANT NEOPLASM OF PROSTATE: ICD-10-CM

## 2017-01-24 DIAGNOSIS — G47.33 OBSTRUCTIVE SLEEP APNEA (ADULT) (PEDIATRIC): ICD-10-CM

## 2017-01-24 DIAGNOSIS — Z95.1 PRESENCE OF AORTOCORONARY BYPASS GRAFT: ICD-10-CM

## 2017-01-24 DIAGNOSIS — I10 ESSENTIAL (PRIMARY) HYPERTENSION: ICD-10-CM

## 2017-01-24 DIAGNOSIS — M48.02 SPINAL STENOSIS, CERVICAL REGION: ICD-10-CM

## 2017-01-24 DIAGNOSIS — F32.9 MAJOR DEPRESSIVE DISORDER, SINGLE EPISODE, UNSPECIFIED: ICD-10-CM

## 2017-01-24 DIAGNOSIS — I48.91 UNSPECIFIED ATRIAL FIBRILLATION: ICD-10-CM

## 2017-01-24 DIAGNOSIS — Z87.891 PERSONAL HISTORY OF NICOTINE DEPENDENCE: ICD-10-CM

## 2017-01-26 ENCOUNTER — OUTPATIENT (OUTPATIENT)
Dept: OUTPATIENT SERVICES | Facility: HOSPITAL | Age: 77
LOS: 1 days | End: 2017-01-26
Payer: MEDICARE

## 2017-01-26 ENCOUNTER — APPOINTMENT (OUTPATIENT)
Dept: CARDIOTHORACIC SURGERY | Facility: CLINIC | Age: 77
End: 2017-01-26

## 2017-01-26 VITALS
WEIGHT: 261 LBS | OXYGEN SATURATION: 97 % | HEIGHT: 72 IN | SYSTOLIC BLOOD PRESSURE: 120 MMHG | DIASTOLIC BLOOD PRESSURE: 57 MMHG | HEART RATE: 70 BPM | RESPIRATION RATE: 16 BRPM | BODY MASS INDEX: 35.35 KG/M2 | TEMPERATURE: 97.6 F

## 2017-01-26 DIAGNOSIS — I10 ESSENTIAL (PRIMARY) HYPERTENSION: ICD-10-CM

## 2017-01-26 DIAGNOSIS — Z98.890 OTHER SPECIFIED POSTPROCEDURAL STATES: ICD-10-CM

## 2017-01-26 DIAGNOSIS — Z95.828 PRESENCE OF OTHER VASCULAR IMPLANTS AND GRAFTS: ICD-10-CM

## 2017-01-26 DIAGNOSIS — Z95.1 PRESENCE OF AORTOCORONARY BYPASS GRAFT: ICD-10-CM

## 2017-01-26 DIAGNOSIS — Z09 ENCOUNTER FOR FOLLOW-UP EXAMINATION AFTER COMPLETED TREATMENT FOR CONDITIONS OTHER THAN MALIGNANT NEOPLASM: ICD-10-CM

## 2017-01-26 DIAGNOSIS — Z98.890 OTHER SPECIFIED POSTPROCEDURAL STATES: Chronic | ICD-10-CM

## 2017-01-26 DIAGNOSIS — Z95.1 PRESENCE OF AORTOCORONARY BYPASS GRAFT: Chronic | ICD-10-CM

## 2017-01-26 DIAGNOSIS — Z90.89 ACQUIRED ABSENCE OF OTHER ORGANS: Chronic | ICD-10-CM

## 2017-01-26 DIAGNOSIS — Z90.79 ACQUIRED ABSENCE OF OTHER GENITAL ORGAN(S): Chronic | ICD-10-CM

## 2017-01-26 LAB
ALBUMIN SERPL ELPH-MCNC: 3 G/DL — LOW (ref 3.4–5)
ALP SERPL-CCNC: 82 U/L — SIGNIFICANT CHANGE UP (ref 40–120)
ALT FLD-CCNC: 29 U/L — SIGNIFICANT CHANGE UP (ref 12–42)
ANION GAP SERPL CALC-SCNC: 7 MMOL/L — LOW (ref 9–16)
AST SERPL-CCNC: 11 U/L — LOW (ref 15–37)
BASOPHILS NFR BLD AUTO: 0 % — SIGNIFICANT CHANGE UP (ref 0–2)
BILIRUB SERPL-MCNC: 0.3 MG/DL — SIGNIFICANT CHANGE UP (ref 0.2–1.2)
BUN SERPL-MCNC: 17 MG/DL — SIGNIFICANT CHANGE UP (ref 7–23)
CALCIUM SERPL-MCNC: 8.2 MG/DL — LOW (ref 8.5–10.5)
CHLORIDE SERPL-SCNC: 108 MMOL/L — SIGNIFICANT CHANGE UP (ref 96–108)
CO2 SERPL-SCNC: 23 MMOL/L — SIGNIFICANT CHANGE UP (ref 22–31)
CREAT SERPL-MCNC: 0.73 MG/DL — SIGNIFICANT CHANGE UP (ref 0.5–1.3)
EOSINOPHIL NFR BLD AUTO: 0 % — SIGNIFICANT CHANGE UP (ref 0–6)
GLUCOSE SERPL-MCNC: 94 MG/DL — SIGNIFICANT CHANGE UP (ref 70–99)
HCT VFR BLD CALC: 27.5 % — LOW (ref 39–50)
HGB BLD-MCNC: 8.8 G/DL — LOW (ref 13–17)
LYMPHOCYTES # BLD AUTO: 19.2 % — SIGNIFICANT CHANGE UP (ref 13–44)
MCHC RBC-ENTMCNC: 25.8 PG — LOW (ref 27–34)
MCHC RBC-ENTMCNC: 32 G/DL — SIGNIFICANT CHANGE UP (ref 32–36)
MCV RBC AUTO: 80.6 FL — SIGNIFICANT CHANGE UP (ref 80–100)
MONOCYTES NFR BLD AUTO: 14.7 % — HIGH (ref 2–14)
NEUTROPHILS NFR BLD AUTO: 66.1 % — SIGNIFICANT CHANGE UP (ref 43–77)
PLATELET # BLD AUTO: 328 K/UL — SIGNIFICANT CHANGE UP (ref 150–400)
POTASSIUM SERPL-MCNC: 4.1 MMOL/L — SIGNIFICANT CHANGE UP (ref 3.5–5.3)
POTASSIUM SERPL-SCNC: 4.1 MMOL/L — SIGNIFICANT CHANGE UP (ref 3.5–5.3)
PROT SERPL-MCNC: 6.3 G/DL — LOW (ref 6.4–8.2)
RBC # BLD: 3.41 M/UL — LOW (ref 4.2–5.8)
RBC # FLD: 14.8 % — SIGNIFICANT CHANGE UP (ref 10.3–16.9)
SODIUM SERPL-SCNC: 138 MMOL/L — SIGNIFICANT CHANGE UP (ref 135–145)
WBC # BLD: 6.2 K/UL — SIGNIFICANT CHANGE UP (ref 3.8–10.5)
WBC # FLD AUTO: 6.2 K/UL — SIGNIFICANT CHANGE UP (ref 3.8–10.5)

## 2017-01-26 PROCEDURE — 80053 COMPREHEN METABOLIC PANEL: CPT

## 2017-01-26 PROCEDURE — 85025 COMPLETE CBC W/AUTO DIFF WBC: CPT

## 2017-01-26 PROCEDURE — 36415 COLL VENOUS BLD VENIPUNCTURE: CPT

## 2017-01-26 RX ORDER — PANTOPRAZOLE SODIUM 40 MG/1
40 TABLET, DELAYED RELEASE ORAL
Refills: 0 | Status: ACTIVE | COMMUNITY
Start: 2017-01-26

## 2017-01-26 RX ORDER — AMIODARONE HYDROCHLORIDE 200 MG/1
200 TABLET ORAL DAILY
Qty: 30 | Refills: 1 | Status: ACTIVE | COMMUNITY
Start: 2017-01-26

## 2017-01-26 RX ORDER — POTASSIUM CHLORIDE 1500 MG/1
20 TABLET, EXTENDED RELEASE ORAL
Refills: 0 | Status: ACTIVE | COMMUNITY
Start: 2017-01-26

## 2017-01-26 RX ORDER — ACETAMINOPHEN 325 MG/1
325 TABLET, FILM COATED ORAL
Qty: 240 | Refills: 0 | Status: ACTIVE | COMMUNITY
Start: 2017-01-26

## 2017-01-26 RX ORDER — ASPIRIN ENTERIC COATED TABLETS 81 MG 81 MG/1
81 TABLET, DELAYED RELEASE ORAL DAILY
Qty: 30 | Refills: 3 | Status: ACTIVE | COMMUNITY
Start: 2017-01-26

## 2017-01-26 RX ORDER — BUDESONIDE 0.5 MG/2ML
0.5 INHALANT ORAL
Refills: 0 | Status: ACTIVE | COMMUNITY
Start: 2017-01-26

## 2017-01-26 RX ORDER — METOPROLOL TARTRATE 25 MG/1
25 TABLET, FILM COATED ORAL
Qty: 30 | Refills: 3 | Status: ACTIVE | COMMUNITY
Start: 2017-01-26

## 2017-01-27 DIAGNOSIS — Z09 ENCOUNTER FOR FOLLOW-UP EXAMINATION AFTER COMPLETED TREATMENT FOR CONDITIONS OTHER THAN MALIGNANT NEOPLASM: ICD-10-CM

## 2017-02-10 PROBLEM — M48.02 SPINAL STENOSIS, CERVICAL REGION: Chronic | Status: ACTIVE | Noted: 2017-01-09

## 2017-02-10 PROBLEM — I10 ESSENTIAL (PRIMARY) HYPERTENSION: Chronic | Status: ACTIVE | Noted: 2017-01-09

## 2017-02-10 PROBLEM — I63.9 CEREBRAL INFARCTION, UNSPECIFIED: Chronic | Status: ACTIVE | Noted: 2017-01-09

## 2017-02-10 PROBLEM — K44.9 DIAPHRAGMATIC HERNIA WITHOUT OBSTRUCTION OR GANGRENE: Chronic | Status: ACTIVE | Noted: 2017-01-09

## 2017-02-10 PROBLEM — G47.33 OBSTRUCTIVE SLEEP APNEA (ADULT) (PEDIATRIC): Chronic | Status: ACTIVE | Noted: 2017-01-09

## 2017-02-10 PROBLEM — M31.6 OTHER GIANT CELL ARTERITIS: Chronic | Status: ACTIVE | Noted: 2017-01-09

## 2017-02-10 PROBLEM — E78.5 HYPERLIPIDEMIA, UNSPECIFIED: Chronic | Status: ACTIVE | Noted: 2017-01-09

## 2017-02-10 PROBLEM — C61 MALIGNANT NEOPLASM OF PROSTATE: Chronic | Status: ACTIVE | Noted: 2017-01-09

## 2017-02-10 PROBLEM — J45.909 UNSPECIFIED ASTHMA, UNCOMPLICATED: Chronic | Status: ACTIVE | Noted: 2017-01-09

## 2017-04-21 ENCOUNTER — MESSAGE (OUTPATIENT)
Age: 77
End: 2017-04-21

## 2019-03-08 NOTE — PATIENT PROFILE ADULT. - NS PRO PT RIGHT SUPPORT PERSON
No call back from pt, closing encounter. Will readdress when if appt made or refill requested again. Yes

## 2020-08-09 ENCOUNTER — TRANSCRIPTION ENCOUNTER (OUTPATIENT)
Age: 80
End: 2020-08-09

## 2020-11-29 NOTE — DISCHARGE NOTE ADULT - PLAN OF CARE
Reviewed plan with Dr. Dana Acosta. Reviewed GI doctor Dr. Ginger Phipps. IVF, IV PPI, FFP/VitK, Monitor H/H Q6, transfuse to keep Hgb >8, May do EGD over weekend (INR goal 1.5-2 before procedure). Keep NPO      Dr. Dana Acosta wants stat Potassium checked 1 hour after 2nd unit of PRBC's completed. s/p Ascending aortic/hemiarch replacement with reimplantation of coronaries - Please follow up with Dr. Villalba on Thursday 1/26/17 at 2:00PM.  The office is located at [facility], [facility], 4th floor. Call us with any questions #[phone].  - Please continue a lactose free diet until seen by Dr. Villalba   - Please continue to take potassium supplements twice a day until seen by Dr. Villalba   -Walk daily as tolerated and use your incentive spirometer every hour.    -No driving or strenuous activity/exercise for 6 weeks, or until cleared by your surgeon.    -Gently clean your incisions with anti-bacterial soap and water, pat dry.  You may leave them open to air.    -Call your doctor if you have shortness of breath, chest pain not relieved by pain medication, dizziness, fever >101.5, or increased redness or drainage from incisions. - Please follow up with Dr. Villalba on Thursday 1/26/17 at 2:00PM.  The office is located at [facility], [facility], 4th floor. Call us with any questions #[phone].  - Please continue a lactose free diet until seen by Dr. Villalba   - Please continue to take potassium supplements twice a day for 3 days   -Walk daily as tolerated and use your incentive spirometer every hour.    -No driving or strenuous activity/exercise for 6 weeks, or until cleared by your surgeon.    -Gently clean your incisions with anti-bacterial soap and water, pat dry.  You may leave them open to air.    -Call your doctor if you have shortness of breath, chest pain not relieved by pain medication, dizziness, fever >101.5, or increased redness or drainage from incisions.

## 2025-02-18 NOTE — DISCHARGE NOTE ADULT - THE PATIENT HAS
Patient vomiting Monday endorsed drinking etoh all weekend, quite a bit and still  dry heaving and unable to tolerate any food or liquid.     
no difficulties